# Patient Record
Sex: MALE | Race: WHITE | NOT HISPANIC OR LATINO | Employment: OTHER | URBAN - METROPOLITAN AREA
[De-identification: names, ages, dates, MRNs, and addresses within clinical notes are randomized per-mention and may not be internally consistent; named-entity substitution may affect disease eponyms.]

---

## 2017-04-04 ENCOUNTER — TELEPHONE (OUTPATIENT)
Dept: CARDIOLOGY | Facility: CLINIC | Age: 67
End: 2017-04-04

## 2017-04-04 NOTE — TELEPHONE ENCOUNTER
Patient called to let us know he had been in the hospital in [place] at [facility]. Patient then saw a general cardiologist in [place] as well in which he has those records. Patient was then referred to see an EP doctor, Dr. Claude Corcoran. We will try to obtain these records and have moved patient's appt up to May 22, 2017 to see Dr. Guthrie at 11:00 AM that day. Patient verbalized understanding.

## 2017-05-03 RX ORDER — CLOPIDOGREL BISULFATE 75 MG/1
75 TABLET ORAL DAILY
Qty: 30 TABLET | Refills: 11 | Status: SHIPPED | OUTPATIENT
Start: 2017-05-03 | End: 2017-10-03 | Stop reason: SDUPTHER

## 2017-05-22 ENCOUNTER — OFFICE VISIT (OUTPATIENT)
Dept: CARDIOLOGY | Facility: CLINIC | Age: 67
End: 2017-05-22

## 2017-05-22 VITALS
HEART RATE: 61 BPM | BODY MASS INDEX: 34.27 KG/M2 | WEIGHT: 239.4 LBS | DIASTOLIC BLOOD PRESSURE: 78 MMHG | SYSTOLIC BLOOD PRESSURE: 124 MMHG | HEIGHT: 70 IN

## 2017-05-22 DIAGNOSIS — I25.10 CORONARY ARTERY DISEASE INVOLVING NATIVE CORONARY ARTERY OF NATIVE HEART WITHOUT ANGINA PECTORIS: ICD-10-CM

## 2017-05-22 DIAGNOSIS — I48.91 ATRIAL FIBRILLATION, UNSPECIFIED TYPE (HCC): Primary | ICD-10-CM

## 2017-05-22 DIAGNOSIS — E78.5 DYSLIPIDEMIA: ICD-10-CM

## 2017-05-22 DIAGNOSIS — I10 ESSENTIAL HYPERTENSION: ICD-10-CM

## 2017-05-22 PROCEDURE — 93000 ELECTROCARDIOGRAM COMPLETE: CPT | Performed by: INTERNAL MEDICINE

## 2017-05-22 PROCEDURE — 99214 OFFICE O/P EST MOD 30 MIN: CPT | Performed by: INTERNAL MEDICINE

## 2017-05-22 RX ORDER — DILTIAZEM HYDROCHLORIDE 180 MG/1
180 CAPSULE, COATED, EXTENDED RELEASE ORAL DAILY
COMMUNITY
End: 2018-04-11 | Stop reason: SDUPTHER

## 2017-05-22 RX ORDER — HYDRALAZINE HYDROCHLORIDE 100 MG/1
TABLET, FILM COATED ORAL 3 TIMES DAILY
COMMUNITY
Start: 2017-03-14 | End: 2017-05-22 | Stop reason: ALTCHOICE

## 2017-05-22 RX ORDER — SOTALOL HYDROCHLORIDE 80 MG/1
80 TABLET ORAL EVERY 12 HOURS SCHEDULED
Qty: 60 TABLET | Refills: 0 | Status: SHIPPED | OUTPATIENT
Start: 2017-05-22 | End: 2017-06-10 | Stop reason: SDUPTHER

## 2017-05-22 RX ORDER — TORSEMIDE 20 MG/1
20 TABLET ORAL DAILY
COMMUNITY
End: 2018-04-11 | Stop reason: SDUPTHER

## 2017-05-22 RX ORDER — METOPROLOL SUCCINATE 100 MG/1
100 TABLET, EXTENDED RELEASE ORAL 2 TIMES DAILY
COMMUNITY
End: 2017-05-22 | Stop reason: ALTCHOICE

## 2017-05-22 RX ORDER — LISINOPRIL 40 MG/1
40 TABLET ORAL DAILY
COMMUNITY
Start: 2017-04-09 | End: 2018-04-11 | Stop reason: SDUPTHER

## 2017-05-22 RX ORDER — CLONIDINE HYDROCHLORIDE 0.1 MG/1
0.1 TABLET ORAL AS NEEDED
COMMUNITY
End: 2017-05-22 | Stop reason: ALTCHOICE

## 2017-05-22 RX ORDER — FERROUS SULFATE 325(65) MG
325 TABLET ORAL
COMMUNITY
End: 2017-08-23

## 2017-05-25 ENCOUNTER — PREP FOR SURGERY (OUTPATIENT)
Dept: OTHER | Facility: HOSPITAL | Age: 67
End: 2017-05-25

## 2017-05-25 DIAGNOSIS — I48.19 PERSISTENT ATRIAL FIBRILLATION (HCC): Primary | ICD-10-CM

## 2017-05-25 RX ORDER — ACETAMINOPHEN 325 MG/1
650 TABLET ORAL EVERY 4 HOURS PRN
Status: CANCELLED | OUTPATIENT
Start: 2017-05-25

## 2017-05-25 RX ORDER — SODIUM CHLORIDE 0.9 % (FLUSH) 0.9 %
1-10 SYRINGE (ML) INJECTION AS NEEDED
Status: CANCELLED | OUTPATIENT
Start: 2017-05-25

## 2017-05-26 ENCOUNTER — HOSPITAL ENCOUNTER (OUTPATIENT)
Dept: CARDIOLOGY | Facility: HOSPITAL | Age: 67
Discharge: HOME | End: 2017-05-26
Attending: INTERNAL MEDICINE | Admitting: INTERNAL MEDICINE

## 2017-05-26 VITALS
SYSTOLIC BLOOD PRESSURE: 124 MMHG | HEART RATE: 68 BPM | WEIGHT: 234.35 LBS | HEIGHT: 70 IN | DIASTOLIC BLOOD PRESSURE: 75 MMHG | BODY MASS INDEX: 33.55 KG/M2 | RESPIRATION RATE: 18 BRPM | OXYGEN SATURATION: 100 % | TEMPERATURE: 97.6 F

## 2017-05-26 DIAGNOSIS — I48.19 PERSISTENT ATRIAL FIBRILLATION (HCC): ICD-10-CM

## 2017-05-26 DIAGNOSIS — I48.91 ATRIAL FIBRILLATION, UNSPECIFIED TYPE (HCC): ICD-10-CM

## 2017-05-26 LAB
ALBUMIN SERPL-MCNC: 4.3 G/DL (ref 3.2–4.8)
ALBUMIN/GLOB SERPL: 1.9 G/DL (ref 1.5–2.5)
ALP SERPL-CCNC: 81 U/L (ref 25–100)
ALT SERPL W P-5'-P-CCNC: 24 U/L (ref 7–40)
ANION GAP SERPL CALCULATED.3IONS-SCNC: 6 MMOL/L (ref 3–11)
AST SERPL-CCNC: 17 U/L (ref 0–33)
BILIRUB SERPL-MCNC: 1 MG/DL (ref 0.3–1.2)
BUN BLD-MCNC: 16 MG/DL (ref 9–23)
BUN/CREAT SERPL: 20 (ref 7–25)
CALCIUM SPEC-SCNC: 9.8 MG/DL (ref 8.7–10.4)
CHLORIDE SERPL-SCNC: 105 MMOL/L (ref 99–109)
CO2 SERPL-SCNC: 30 MMOL/L (ref 20–31)
CREAT BLD-MCNC: 0.8 MG/DL (ref 0.6–1.3)
DEPRECATED RDW RBC AUTO: 49.5 FL (ref 37–54)
ERYTHROCYTE [DISTWIDTH] IN BLOOD BY AUTOMATED COUNT: 13.9 % (ref 11.3–14.5)
GFR SERPL CREATININE-BSD FRML MDRD: 97 ML/MIN/1.73
GLOBULIN UR ELPH-MCNC: 2.3 GM/DL
GLUCOSE BLD-MCNC: 164 MG/DL (ref 70–100)
HBA1C MFR BLD: 7.4 % (ref 4.8–5.6)
HCT VFR BLD AUTO: 45.4 % (ref 38.9–50.9)
HGB BLD-MCNC: 14.4 G/DL (ref 13.1–17.5)
MCH RBC QN AUTO: 30.4 PG (ref 27–31)
MCHC RBC AUTO-ENTMCNC: 31.7 G/DL (ref 32–36)
MCV RBC AUTO: 96 FL (ref 80–99)
PLATELET # BLD AUTO: 247 10*3/MM3 (ref 150–450)
PMV BLD AUTO: 10.6 FL (ref 6–12)
POTASSIUM BLD-SCNC: 4.5 MMOL/L (ref 3.5–5.5)
PROT SERPL-MCNC: 6.6 G/DL (ref 5.7–8.2)
RBC # BLD AUTO: 4.73 10*6/MM3 (ref 4.2–5.76)
SODIUM BLD-SCNC: 141 MMOL/L (ref 132–146)
WBC NRBC COR # BLD: 10.21 10*3/MM3 (ref 3.5–10.8)

## 2017-05-26 PROCEDURE — 83036 HEMOGLOBIN GLYCOSYLATED A1C: CPT | Performed by: INTERNAL MEDICINE

## 2017-05-26 PROCEDURE — 85027 COMPLETE CBC AUTOMATED: CPT | Performed by: NURSE PRACTITIONER

## 2017-05-26 PROCEDURE — 36415 COLL VENOUS BLD VENIPUNCTURE: CPT

## 2017-05-26 PROCEDURE — 80053 COMPREHEN METABOLIC PANEL: CPT | Performed by: NURSE PRACTITIONER

## 2017-05-26 PROCEDURE — 93005 ELECTROCARDIOGRAM TRACING: CPT | Performed by: INTERNAL MEDICINE

## 2017-05-26 PROCEDURE — 92960 CARDIOVERSION ELECTRIC EXT: CPT

## 2017-05-26 PROCEDURE — 92960 CARDIOVERSION ELECTRIC EXT: CPT | Performed by: INTERNAL MEDICINE

## 2017-05-26 PROCEDURE — 25010000002 MIDAZOLAM PER 1 MG: Performed by: INTERNAL MEDICINE

## 2017-05-26 PROCEDURE — 93005 ELECTROCARDIOGRAM TRACING: CPT | Performed by: NURSE PRACTITIONER

## 2017-05-26 RX ORDER — ACETAMINOPHEN 325 MG/1
650 TABLET ORAL EVERY 4 HOURS PRN
Status: DISCONTINUED | OUTPATIENT
Start: 2017-05-26 | End: 2017-05-26 | Stop reason: HOSPADM

## 2017-05-26 RX ORDER — FLUMAZENIL 0.1 MG/ML
INJECTION INTRAVENOUS
Status: DISCONTINUED
Start: 2017-05-26 | End: 2017-05-26 | Stop reason: WASHOUT

## 2017-05-26 RX ORDER — SODIUM CHLORIDE 0.9 % (FLUSH) 0.9 %
1-10 SYRINGE (ML) INJECTION AS NEEDED
Status: DISCONTINUED | OUTPATIENT
Start: 2017-05-26 | End: 2017-05-26 | Stop reason: HOSPADM

## 2017-05-26 RX ORDER — MIDAZOLAM HYDROCHLORIDE 1 MG/ML
INJECTION INTRAMUSCULAR; INTRAVENOUS
Status: DISCONTINUED
Start: 2017-05-26 | End: 2017-05-26 | Stop reason: HOSPADM

## 2017-05-26 RX ORDER — HYDRALAZINE HYDROCHLORIDE 100 MG/1
100 TABLET, FILM COATED ORAL AS NEEDED
COMMUNITY
End: 2018-04-11 | Stop reason: SDUPTHER

## 2017-05-26 RX ORDER — NALOXONE HYDROCHLORIDE 0.4 MG/ML
INJECTION, SOLUTION INTRAMUSCULAR; INTRAVENOUS; SUBCUTANEOUS
Status: DISCONTINUED
Start: 2017-05-26 | End: 2017-05-26 | Stop reason: WASHOUT

## 2017-05-26 RX ORDER — MIDAZOLAM HYDROCHLORIDE 1 MG/ML
INJECTION INTRAMUSCULAR; INTRAVENOUS
Status: COMPLETED | OUTPATIENT
Start: 2017-05-26 | End: 2017-05-26

## 2017-05-26 RX ORDER — CLONIDINE HYDROCHLORIDE 0.1 MG/1
0.1 TABLET ORAL AS NEEDED
COMMUNITY
End: 2018-04-09 | Stop reason: ALTCHOICE

## 2017-05-26 RX ADMIN — METHOHEXITAL SODIUM 30 MG: 500 INJECTION, POWDER, LYOPHILIZED, FOR SOLUTION INTRAMUSCULAR; INTRAVENOUS; RECTAL at 09:54

## 2017-05-26 RX ADMIN — MIDAZOLAM HYDROCHLORIDE 2 MG: 1 INJECTION, SOLUTION INTRAMUSCULAR; INTRAVENOUS at 09:54

## 2017-06-06 ENCOUNTER — TELEPHONE (OUTPATIENT)
Dept: CARDIOLOGY | Facility: CLINIC | Age: 67
End: 2017-06-06

## 2017-06-06 NOTE — TELEPHONE ENCOUNTER
Spoke with Dr. Guthrie. Patient needs to get an ekg to confirm that he is atrial fibrillation. If he is he needs to be referred to EP for further treatment.

## 2017-06-06 NOTE — TELEPHONE ENCOUNTER
Patient returned the missed call at 4:13pm.  I attempted to call him back at 4:35pm and No answer again. Awaiting his return call.

## 2017-06-07 NOTE — TELEPHONE ENCOUNTER
Patient called back and stated that he could not get his EKG done in his hometown.  He is going to come to our office on 6/8/17 and have his EKG done.

## 2017-06-12 RX ORDER — SOTALOL HYDROCHLORIDE 80 MG/1
80 TABLET ORAL EVERY 12 HOURS SCHEDULED
Qty: 60 TABLET | Refills: 1 | Status: SHIPPED | OUTPATIENT
Start: 2017-06-12 | End: 2017-08-25 | Stop reason: HOSPADM

## 2017-06-12 RX ORDER — SOTALOL HYDROCHLORIDE 80 MG/1
TABLET ORAL
Qty: 60 TABLET | Refills: 1 | Status: SHIPPED | OUTPATIENT
Start: 2017-06-12 | End: 2017-06-12 | Stop reason: SDUPTHER

## 2017-06-26 ENCOUNTER — OFFICE VISIT (OUTPATIENT)
Dept: CARDIOLOGY | Facility: CLINIC | Age: 67
End: 2017-06-26

## 2017-06-26 VITALS
DIASTOLIC BLOOD PRESSURE: 76 MMHG | HEART RATE: 77 BPM | BODY MASS INDEX: 32.04 KG/M2 | WEIGHT: 223.8 LBS | SYSTOLIC BLOOD PRESSURE: 118 MMHG | HEIGHT: 70 IN

## 2017-06-26 DIAGNOSIS — I48.0 PAROXYSMAL ATRIAL FIBRILLATION (HCC): ICD-10-CM

## 2017-06-26 DIAGNOSIS — I25.10 CORONARY ARTERY DISEASE INVOLVING NATIVE CORONARY ARTERY OF NATIVE HEART WITHOUT ANGINA PECTORIS: Primary | ICD-10-CM

## 2017-06-26 PROCEDURE — 93000 ELECTROCARDIOGRAM COMPLETE: CPT | Performed by: INTERNAL MEDICINE

## 2017-06-26 PROCEDURE — 99214 OFFICE O/P EST MOD 30 MIN: CPT | Performed by: INTERNAL MEDICINE

## 2017-06-26 NOTE — PROGRESS NOTES
Subjective:     Encounter Date:06/26/2017      Patient ID: Juan Lauren is a 66 y.o. male.    Chief Complaint: Coronary Artery Disease; Atrial Fibrillation; s/p Cardioversion; Loss of Consciousness (collasped on 6/24/17); and Fatigue    PROBLEM LIST:  1. CAD:   a. January 2004, ST elevation MI, cardiac cath revealed a 95% RCA stenosis, which was reduced to 0% after a 3.0 x 18 mm Cypher stent. There was no critical left coronary anatomy disease.   b. December 2012, GXT echocardiogram negative for ischemia.  c. On 10/07/2015, acute inferior STEMI. Catheterization is 90% thrombotic, VLST of RCA (neoatherosclerosis with plaque rupture). Restented 3.5 mm x 23 mm Xience Alpine. EF 45%.   2. Atrial fibrillation  a. Initial diagnosis in [facility]) 1/17. With RVR.  b. LEXII 1/17 possible OSCAR clot.  c. LEXII guided cardioversion 3/17 (4)  d. Recurrent asymptomatic A. fib 5/17, controlled rate.  e. Successful cardioversion to sinus rhythm on 5/26/2017.  f. Cardioversion successful until 6/26/2017.  3. Hypertension.  4. Dyslipidemia.   5. History of tobacco abuse.  6. Obstructive sleep apnea   a. Treated with CPAP   7. Family history of CAD with father having an MI in his 50s.  8. Surgeries:   a. Appendectomy.   b. Adenoidectomy.     History of Present Illness  Mr. Lauren returns today for a four week follow up s/p cardioversion with a history of atrial fibrillation and coronary artery disease. Since his last visit, patient has had a cardioversion which was initially successful; however, he noticed an irregularity in pulse. He experienced one episode of syncope within the last week upon physical exertion (he was bent over, and then jumped up) with no recurrences. He experiences shortness of breath on exertion. Patient desires to undergo radiofrequency ablation to resolve his atrial fibrillation. He inquires about the time of day he should be taking his medication. Denies any exertional chest pain, orthopnea,  PND. Patient remains active by walking 2 miles daily and as a result he has lost 10 pounds.    No Known Allergies      Current Outpatient Prescriptions:   •  apixaban (ELIQUIS) 5 MG tablet tablet, Take 5 mg by mouth 2 (Two) Times a Day., Disp: , Rfl:   •  atorvastatin (LIPITOR) 80 MG tablet, Take 80 mg by mouth Daily., Disp: , Rfl:   •  CloNIDine (CATAPRES) 0.1 MG tablet, Take 0.1 mg by mouth As Needed for High Blood Pressure., Disp: , Rfl:   •  clopidogrel (PLAVIX) 75 MG tablet, Take 1 tablet by mouth Daily., Disp: 30 tablet, Rfl: 11  •  diltiaZEM CD (CARDIZEM CD) 180 MG 24 hr capsule, Take 180 mg by mouth Daily., Disp: , Rfl:   •  ferrous sulfate 325 (65 FE) MG tablet, Take 325 mg by mouth Daily With Breakfast., Disp: , Rfl:   •  hydrALAZINE (APRESOLINE) 100 MG tablet, Take 100 mg by mouth As Needed., Disp: , Rfl:   •  lisinopril (PRINIVIL,ZESTRIL) 40 MG tablet, Daily., Disp: , Rfl:   •  melatonin 5 MG tablet tablet, Take 10 mg by mouth Daily., Disp: , Rfl:   •  sotalol (BETAPACE AF) 80 MG tablet tablet, Take 1 tablet by mouth Every 12 (Twelve) Hours., Disp: 60 tablet, Rfl: 1  •  torsemide (DEMADEX) 20 MG tablet, Take 20 mg by mouth Daily., Disp: , Rfl:   •  Vardenafil HCl (LEVITRA PO), Take  by mouth As Needed., Disp: , Rfl:     The following portions of the patient's history were reviewed and updated as appropriate: allergies, current medications, past family history, past medical history, past social history, past surgical history and problem list.    Review of Systems   Constitution: Negative.   Cardiovascular: Negative.    Respiratory: Negative.    Hematologic/Lymphatic: Negative for bleeding problem. Does not bruise/bleed easily.   Skin: Negative for rash.   Musculoskeletal: Negative for muscle weakness and myalgias.   Gastrointestinal: Negative for heartburn, nausea and vomiting.   Neurological: Negative.           Objective:     Vitals:    06/26/17 1033   BP: 118/76   BP Location: Left arm   Patient  "Position: Sitting   Pulse: 77   Weight: 223 lb 12.8 oz (102 kg)   Height: 70\" (177.8 cm)         Physical Exam   Constitutional: He is oriented to person, place, and time. He appears well-developed and well-nourished.   HENT:   Mouth/Throat: Oropharynx is clear and moist.   Neck: No JVD present. Carotid bruit is not present. No thyromegaly present.   Cardiovascular: Regular rhythm, S1 normal, S2 normal, normal heart sounds and intact distal pulses.  Exam reveals no gallop, no S3 and no S4.    No murmur heard.  Pulses:       Carotid pulses are 2+ on the right side, and 2+ on the left side.       Radial pulses are 2+ on the right side, and 2+ on the left side.   Pulmonary/Chest: Breath sounds normal.   Abdominal: Soft. Bowel sounds are normal. He exhibits no mass. There is no tenderness.   Musculoskeletal: He exhibits no edema.   Neurological: He is alert and oriented to person, place, and time.   Skin: Skin is warm and dry. No rash noted.       Lab Review:    Lab Results   Component Value Date    HGBA1C 7.40 (H) 05/26/2017     Lab Results   Component Value Date    GLUCOSE 164 (H) 05/26/2017    BUN 16 05/26/2017    CREATININE 0.80 05/26/2017    EGFRIFNONA 97 05/26/2017    BCR 20.0 05/26/2017    K 4.5 05/26/2017    CO2 30.0 05/26/2017    CALCIUM 9.8 05/26/2017    ALBUMIN 4.30 05/26/2017    LABIL2 1.9 05/26/2017    AST 17 05/26/2017    ALT 24 05/26/2017     Lab Results   Component Value Date    WBC 10.21 05/26/2017    HGB 14.4 05/26/2017    HCT 45.4 05/26/2017    MCV 96.0 05/26/2017     05/26/2017           ECG 12 Lead  Date/Time: 6/26/2017 1:02 PM  Performed by: KARMEN TOLEDO  Authorized by: KARMEN TOLEDO   Rhythm: atrial fibrillation                Assessment:   Juan was seen today for coronary artery disease, atrial fibrillation, s/p cardioversion, loss of consciousness and fatigue.    Diagnoses and all orders for this visit:    Coronary artery disease involving native coronary artery of native heart " without angina pectoris    Atrial fibrillation, unspecified type        Impression  1. CAD stable  2. Atrial fibrillation.  Recurrent on sotalol post-cardioversion.  Symptomatic.  3. Hypertension controlled  4. Obstructive sleep apnea wearing  5. Dyslipidemia controlled    Plan:  1. Continue to wear mask for CPAP.  2. Continue to exercise, and maintain healthy habits.  3. Discussed option with patient and wife.  Including Tikosyn versus PVA.  I favor the latter.  4. Schedule electrophysiology consult with Dr. May for for possible pulmonary vein ablation  5. Continue current medications.  6. Revisit in 12 MO, or sooner as needed.    Scribed for Mark Guthrie MD by Rose Marie Kennedy. 6/26/2017  10:59 AM    I, Mark Guthrie MD, personally performed the services described in this documentation as scribed by the above individual in my presence, and it is both accurate and complete      Please note that portions of this note may have been completed with a voice recognition program. Efforts were made to edit the dictations, but occasionally words are mistranscribed.

## 2017-06-28 ENCOUNTER — CONSULT (OUTPATIENT)
Dept: CARDIOLOGY | Facility: CLINIC | Age: 67
End: 2017-06-28

## 2017-06-28 VITALS
WEIGHT: 224.6 LBS | DIASTOLIC BLOOD PRESSURE: 66 MMHG | HEIGHT: 70 IN | HEART RATE: 91 BPM | BODY MASS INDEX: 32.16 KG/M2 | SYSTOLIC BLOOD PRESSURE: 118 MMHG

## 2017-06-28 DIAGNOSIS — I48.19 PERSISTENT ATRIAL FIBRILLATION (HCC): Primary | ICD-10-CM

## 2017-06-28 DIAGNOSIS — I25.10 CORONARY ARTERY DISEASE INVOLVING NATIVE CORONARY ARTERY OF NATIVE HEART WITHOUT ANGINA PECTORIS: ICD-10-CM

## 2017-06-28 DIAGNOSIS — I10 ESSENTIAL HYPERTENSION: ICD-10-CM

## 2017-06-28 PROCEDURE — 99244 OFF/OP CNSLTJ NEW/EST MOD 40: CPT | Performed by: INTERNAL MEDICINE

## 2017-06-28 NOTE — PROGRESS NOTES
Juan Lauren  1950  [phone]      06/28/2017    [facility] CARDIOLOGY     Trang Bellamy MD  [address] [id number]    Chief Complaint   Patient presents with   • Coronary Artery Disease   • Hypertension   • Atrial Fibrillation       PROBLEM LIST:  1. Persistent Atrial fibrillation  a. CHADSVasc = 2 on Eliquis   b. Initial diagnosis in [facility]) 1/17. With RVR.  c. LEXII 1/17 possible OSCAR clot. LEXII EF 55-60%  d. LEXII guided cardioversion 3/17 (4)  e. Recurrent asymptomatic A. fib 5/17, controlled rate.  f. Successful cardioversion to sinus rhythm on 5/26/2017 on Sotalol.  g. Cardioversion successful until 6/26/2017.  2. CAD:   a. January 2004, ST elevation MI, cardiac cath revealed a 95% RCA stenosis, which was reduced to 0% after a 3.0 x 18 mm Cypher stent. There was no critical left coronary anatomy disease.   b. December 2012, GXT echocardiogram negative for ischemia.  c. On 10/07/2015, acute inferior STEMI. Catheterization is 90% thrombotic, VLST of RCA (neoatherosclerosis with plaque rupture). Restented 3.5 mm x 23 mm Xience Alpine. EF 45%.  d. LEXII 1/2017: EF 55-60%   3. Hypertension.  4. Dyslipidemia.   5. History of tobacco abuse.  6. Obstructive sleep apnea   a. Treated with CPAP   7. Family history of CAD with father having an MI in his 50s.  8. Surgeries:   a. Appendectomy.   b. Adenoidectomy.    Allergies  No Known Allergies    Current Medications    Current Outpatient Prescriptions:   •  apixaban (ELIQUIS) 5 MG tablet tablet, Take 1 tablet by mouth 2 (Two) Times a Day., Disp: 180 tablet, Rfl: 3  •  atorvastatin (LIPITOR) 80 MG tablet, Take 80 mg by mouth Daily., Disp: , Rfl:   •  CloNIDine (CATAPRES) 0.1 MG tablet, Take 0.1 mg by mouth As Needed for High Blood Pressure., Disp: , Rfl:   •  clopidogrel (PLAVIX) 75 MG tablet, Take 1 tablet by mouth Daily., Disp: 30 tablet, Rfl: 11  •  diltiaZEM CD (CARDIZEM CD) 180 MG 24 hr capsule, Take  180 mg by mouth Daily., Disp: , Rfl:   •  ferrous sulfate 325 (65 FE) MG tablet, Take 325 mg by mouth Daily With Breakfast., Disp: , Rfl:   •  hydrALAZINE (APRESOLINE) 100 MG tablet, Take 100 mg by mouth As Needed., Disp: , Rfl:   •  lisinopril (PRINIVIL,ZESTRIL) 40 MG tablet, Daily., Disp: , Rfl:   •  melatonin 5 MG tablet tablet, Take 10 mg by mouth Daily., Disp: , Rfl:   •  sotalol (BETAPACE AF) 80 MG tablet tablet, Take 1 tablet by mouth Every 12 (Twelve) Hours., Disp: 60 tablet, Rfl: 1  •  torsemide (DEMADEX) 20 MG tablet, Take 20 mg by mouth Daily., Disp: , Rfl:   •  Vardenafil HCl (LEVITRA PO), Take  by mouth As Needed., Disp: , Rfl:     History of Present Illness   HPI    Pt presents for Atrial fibrillation consult to discuss PVA. He is referred by Dr. Guthrie.  He was diagnosed in January while in [place]. He underwent LEXII/ECV. He had recurrence and was started on Sotalol by Dr. Guthrie and underwent ECV again which was successful. However, he had recurrence after about one week. When he was in NSR, he did feel better. While in atrial fibrillation, he feels tired, fatigued, has exercise intolerance, and has less of an appetite. He has sleep apnea and is compliant with his CPAP. His BP was running high but now is controlled. He does have prn medications if needed. He denies thyroid problems. He denies excess caffeine, now he doesn't drink any caffeine. No ETOH. He used to smoke and quit in 2003. He used to chew but quit in January.     ROS:  General:  + fatigue, - weight gain or loss  Cardiovascular:  Denies CP, PND, syncope, near syncope, edema + palpitations.  Pulmonary:  -GRAYSON, +cough, or wheezing   Hematologic/Lymphatic: Negative for bleeding problem. Does not bruise/bleed easily.   Skin: Negative for rash.   Musculoskeletal: Negative for muscle weakness and myalgias.   Gastrointestinal: Negative for heartburn, nausea and vomiting.   Neurological: Negative      FAMILY HISTORY: Premature coronary artery  "disease in his father, in his 50s.     SOCIAL HISTORY: Patient is . He is retired. No longer smokes. No  significant alcohol use    Vitals:    06/28/17 1059   BP: 118/66   BP Location: Left arm   Patient Position: Sitting   Pulse: 91   Weight: 224 lb 9.6 oz (102 kg)   Height: 70\" (177.8 cm)     PE:  General: NAD  Neck: no JVD, no carotid bruits, no TM  Heart irr irr, NL S1, S2, no rubs, murmurs  Lungs: CTA, no wheezes, rhonchi, or rales  Abd: soft, non-tender, NL BS  Ext: No musculoskeletal deformities, no edema, cyanosis, or clubbing  Psych: normal mood and affect    Diagnostic Data:  Procedures     EKG reviewed from Dr. Guthrie's clinic 6/26/17: atrial fibrillation 77 bpm    1. Persistent atrial fibrillation    2. Essential hypertension    3. Coronary artery disease involving native coronary artery of native heart without angina pectoris          Plan:  1) Persistent Atrial Fibrillation- failed Sotalol. Interested in PVA and does not wish to take Tikosyn. Dr. May talked with the patient about the procedure including details, risks, potential complications. The patient understands and wishes to proceed. We will put him on the schedule.    CHADSVasc = 2 on Eliquis  2) HTN- controlled  3) CAD- stable, continue following with Dr. Guthrie, continue Plavix    Scribed for Wayne May MD by Meghann Augustin PA-C. 6/28/2017  11:28 AM     I, Wayne May MD, personally performed the services face to face as described in this documentation and as scribed by the above named individual in my presence, and it is both accurate and complete.  6/28/2017  11:33 AM          "

## 2017-07-24 ENCOUNTER — PREP FOR SURGERY (OUTPATIENT)
Dept: OTHER | Facility: HOSPITAL | Age: 67
End: 2017-07-24

## 2017-07-24 DIAGNOSIS — I48.0 PAROXYSMAL ATRIAL FIBRILLATION (HCC): Primary | ICD-10-CM

## 2017-07-24 RX ORDER — SODIUM CHLORIDE 0.9 % (FLUSH) 0.9 %
1-10 SYRINGE (ML) INJECTION AS NEEDED
Status: CANCELLED | OUTPATIENT
Start: 2017-07-24

## 2017-08-14 PROBLEM — I48.19 PERSISTENT ATRIAL FIBRILLATION (HCC): Status: ACTIVE | Noted: 2017-05-22

## 2017-08-14 NOTE — NURSING NOTE
PRE-PVA ASSESSMENT  Juan Lauren 1950   [address] [facility] [address]   [phone] (home)     Referral Source: Dr. Guthrie  Information obtained from: [x] Medical record review  [] Patient report  Scheduled for: PVA on 8/24/17 with Dr May    AFib Specific History:   AFib Type: persistent  [id number] Score: 3  Contributing Factors: HTN and Vasc Dz, DM  Anticoagulation: Eliquis 5mg BID, plavix 75mg Q Day  Cardioversion x 2, March 2017 & 5/26/17 w/recurrence one month later  Failed AAD(s): sotalol  Prior Ablation: None    Is Mr. Lauren aware of his AFib? Yes   Onset: Dx'ed Jan 2017 in [place], [place]       Frequency: couple times a month       Duration: up to 10days   Exacerbations:    Alleviations:      Symptoms:   [] Palpitations:     [] Chest Discomfort:    [] Dizziness:    [] Presyncope:    [] Lightheadedness:   [] Syncope:    [x] Fatigue:    [x] Other: exercise intolerance, decreased appetite   [] Short of Breath:     Last Echo(s):  [] TTE Date:           [x] LEXII Date: 1/9/17       EF: %     EF: 55-65%        LA: cm    LA: mildly dilated, possible thrombus        VHD?     VHD? Mild TR     Past medical History:   [x] Diabetes: recently diagnosed            Tx? Diet & exercise alone right now           Hemoglobin A1C   Date Value Ref Range Status   05/26/2017 7.40 (H) 4.80 - 5.60 % Final   10/08/2015 6.0 4.00 - 6.00 % Final     Comment:     The American Diabetes Association recommends maintenance of Hemoglobin  A1C at 7.0% or lower. Goals for Hemoglobin A1C reduction may need to be  modified if hypoglycemia is a problem.         [] HYPOthyroidism  [] HYPERthyroidism -NO   TSH   Date Value Ref Range Status   08/23/2017 1.271 0.350 - 5.350 mIU/mL Final       [x] HTN        [x] Tx? Diltiazem 180mg Q Day, lisinopril 40mg Q day, torsemide 20mg Q Day, PRN clonidine (hasn't needed in past month)        [x] Controlled? Yes    [] Heart Failure -NO    [] CVA -NO                                  []  TIA -NO         [] Ischemic         [] Hemorrhagic         [] Nonischemic         [] Embolic        [] Diastolic    [x] CAD         [x] MI -STEMI 1/04 & 10/7/15         [x] Dyslipidemia -on lipitor    [x] Ischemic Evaluation       [] Stress Test:        [x] Heart Cath: Jan 2004: RCA 95%, reduced to 0% w/ cypher stent                               10/7/15: VLST of RCA (neoatherosclerosis with plaque rupture). Restented 3.5 mm x 23 mm Xience Alpine. EF 45%.     [x] Sleep Apnea Diagnosed       Device: CPAP        Compliance: compliance all of the time    [x] Obesity       [x] BMI 30-35 (32.1 on 6/28/17)        [] BMI >35         [x] Weight reduction discussed with Mr. Lauren         [x] Nutrition Consult            [x] Declined by Mr. Lauren -has already been working with diabetes nutrition; on low carb diet    [] Depression   [] Anxiety -NO                [] Urologic History -NO       [] Urologic cancer surgery         [] Severe decrease in flow of urine stream        [] Recent unexplained gross hematuria        [] Hx urethral stricture     Other Pertinent PMH: None    Social / Lifestyle History:   [x]   Tobacco: Quit 2003                     [x] Former: yrs    []   Alcohol: None    []   Caffeine: 0 cups per day   []   Recreational Drugs: Denies   []   Stress Issues: Denies   [x]   Exercise: walking 4 miles a day    Summary of Patient Contact:  I spoke with Mr. Lauren about his upcoming PVA.  He was well informed about the procedure from prior discussion with Dr May and from reading the provided literature.  I answered a few remaining questions.  Mr. Lauren verbalized understanding and he is ready to proceed.      Judy Caisllas, NADERN, RN

## 2017-08-23 ENCOUNTER — APPOINTMENT (OUTPATIENT)
Dept: PREADMISSION TESTING | Facility: HOSPITAL | Age: 67
End: 2017-08-23

## 2017-08-23 ENCOUNTER — HOSPITAL ENCOUNTER (OUTPATIENT)
Dept: CT IMAGING | Facility: HOSPITAL | Age: 67
Discharge: HOME | End: 2017-08-23
Attending: INTERNAL MEDICINE | Admitting: INTERNAL MEDICINE

## 2017-08-23 DIAGNOSIS — I48.0 PAROXYSMAL ATRIAL FIBRILLATION (HCC): ICD-10-CM

## 2017-08-23 DIAGNOSIS — I48.19 PERSISTENT ATRIAL FIBRILLATION (HCC): Primary | ICD-10-CM

## 2017-08-23 DIAGNOSIS — I48.19 PERSISTENT ATRIAL FIBRILLATION (HCC): ICD-10-CM

## 2017-08-23 LAB
ANION GAP SERPL CALCULATED.3IONS-SCNC: 6 MMOL/L (ref 3–11)
BASOPHILS # BLD AUTO: 0.02 10*3/MM3 (ref 0–0.2)
BASOPHILS NFR BLD AUTO: 0.3 % (ref 0–1)
BUN BLD-MCNC: 12 MG/DL (ref 9–23)
BUN/CREAT SERPL: 15 (ref 7–25)
CALCIUM SPEC-SCNC: 10 MG/DL (ref 8.7–10.4)
CHLORIDE SERPL-SCNC: 102 MMOL/L (ref 99–109)
CO2 SERPL-SCNC: 31 MMOL/L (ref 20–31)
CREAT BLD-MCNC: 0.8 MG/DL (ref 0.6–1.3)
DEPRECATED RDW RBC AUTO: 49.3 FL (ref 37–54)
EOSINOPHIL # BLD AUTO: 0.06 10*3/MM3 (ref 0–0.3)
EOSINOPHIL NFR BLD AUTO: 1 % (ref 0–3)
ERYTHROCYTE [DISTWIDTH] IN BLOOD BY AUTOMATED COUNT: 13.9 % (ref 11.3–14.5)
GFR SERPL CREATININE-BSD FRML MDRD: 97 ML/MIN/1.73
GLUCOSE BLD-MCNC: 113 MG/DL (ref 70–100)
HCT VFR BLD AUTO: 42 % (ref 38.9–50.9)
HGB BLD-MCNC: 13.5 G/DL (ref 13.1–17.5)
IMM GRANULOCYTES # BLD: 0.01 10*3/MM3 (ref 0–0.03)
IMM GRANULOCYTES NFR BLD: 0.2 % (ref 0–0.6)
INR PPP: 1.04
LYMPHOCYTES # BLD AUTO: 1.11 10*3/MM3 (ref 0.6–4.8)
LYMPHOCYTES NFR BLD AUTO: 19.2 % (ref 24–44)
MCH RBC QN AUTO: 30.6 PG (ref 27–31)
MCHC RBC AUTO-ENTMCNC: 32.1 G/DL (ref 32–36)
MCV RBC AUTO: 95.2 FL (ref 80–99)
MONOCYTES # BLD AUTO: 0.43 10*3/MM3 (ref 0–1)
MONOCYTES NFR BLD AUTO: 7.5 % (ref 0–12)
NEUTROPHILS # BLD AUTO: 4.14 10*3/MM3 (ref 1.5–8.3)
NEUTROPHILS NFR BLD AUTO: 71.8 % (ref 41–71)
PLATELET # BLD AUTO: 213 10*3/MM3 (ref 150–450)
PMV BLD AUTO: 10.4 FL (ref 6–12)
POTASSIUM BLD-SCNC: 4.3 MMOL/L (ref 3.5–5.5)
PROTHROMBIN TIME: 11.4 SECONDS (ref 9.6–11.5)
RBC # BLD AUTO: 4.41 10*6/MM3 (ref 4.2–5.76)
SODIUM BLD-SCNC: 139 MMOL/L (ref 132–146)
TSH SERPL DL<=0.05 MIU/L-ACNC: 1.27 MIU/ML (ref 0.35–5.35)
WBC NRBC COR # BLD: 5.77 10*3/MM3 (ref 3.5–10.8)

## 2017-08-23 RX ORDER — DOCUSATE SODIUM 100 MG/1
100 CAPSULE, LIQUID FILLED ORAL 2 TIMES DAILY
COMMUNITY
End: 2018-04-09

## 2017-08-23 RX ADMIN — IOPAMIDOL 80 ML: 755 INJECTION, SOLUTION INTRAVENOUS at 11:58

## 2017-08-23 NOTE — DISCHARGE INSTRUCTIONS
The following instructions given during Pre Admission Testing visit:    Do not eat or drink anything after MN except for sips of water with your a.m. Prescription meds unless otherwise instructed by your physician.    Glasses and jewelry may be worn, but dentures must be removed prior to cath/procedure.    Leave any items you consider valuable at home.    Family members may wait in [facility] waiting area during procedure.    Bring all medications in their original containers the day of procedure.    Bring photo ID and insurance cards on the day of procedure.    Need to make arrangements for transportation prior to discharge.    The following handouts were given:     Heart Cath pathway (if applicable)   Cardiac Cath booklet published by [facility]    OR appropriate [facility] procedure booklet    If applicable, pt instructed to bring CPAP mask and tubing the day of procedure.

## 2017-08-24 ENCOUNTER — ANESTHESIA EVENT (OUTPATIENT)
Dept: CARDIOLOGY | Facility: HOSPITAL | Age: 67
End: 2017-08-24

## 2017-08-24 ENCOUNTER — HOSPITAL ENCOUNTER (OUTPATIENT)
Facility: HOSPITAL | Age: 67
Setting detail: OBSERVATION
Discharge: HOME | End: 2017-08-25
Attending: INTERNAL MEDICINE | Admitting: INTERNAL MEDICINE

## 2017-08-24 ENCOUNTER — ANESTHESIA (OUTPATIENT)
Dept: CARDIOLOGY | Facility: HOSPITAL | Age: 67
End: 2017-08-24

## 2017-08-24 DIAGNOSIS — I48.19 PERSISTENT ATRIAL FIBRILLATION (HCC): ICD-10-CM

## 2017-08-24 LAB
ACT BLD: 136 SECONDS (ref 82–152)
ACT BLD: 153 SECONDS (ref 82–152)
ACT BLD: 180 SECONDS (ref 82–152)
ACT BLD: 340 SECONDS (ref 82–152)
ACT BLD: 362 SECONDS (ref 82–152)
ACT BLD: 406 SECONDS (ref 82–152)
ACT BLD: 411 SECONDS (ref 82–152)
ACT BLD: 422 SECONDS (ref 82–152)

## 2017-08-24 PROCEDURE — 93656 COMPRE EP EVAL ABLTJ ATR FIB: CPT | Performed by: INTERNAL MEDICINE

## 2017-08-24 PROCEDURE — 25010000002 PROPOFOL 10 MG/ML EMULSION: Performed by: NURSE ANESTHETIST, CERTIFIED REGISTERED

## 2017-08-24 PROCEDURE — G0378 HOSPITAL OBSERVATION PER HR: HCPCS

## 2017-08-24 PROCEDURE — 93623 PRGRMD STIMJ&PACG IV RX NFS: CPT | Performed by: INTERNAL MEDICINE

## 2017-08-24 PROCEDURE — 25010000002 FUROSEMIDE PER 20 MG: Performed by: INTERNAL MEDICINE

## 2017-08-24 PROCEDURE — C1732 CATH, EP, DIAG/ABL, 3D/VECT: HCPCS | Performed by: INTERNAL MEDICINE

## 2017-08-24 PROCEDURE — 94660 CPAP INITIATION&MGMT: CPT

## 2017-08-24 PROCEDURE — C1759 CATH, INTRA ECHOCARDIOGRAPHY: HCPCS | Performed by: INTERNAL MEDICINE

## 2017-08-24 PROCEDURE — 93662 INTRACARDIAC ECG (ICE): CPT | Performed by: INTERNAL MEDICINE

## 2017-08-24 PROCEDURE — C1769 GUIDE WIRE: HCPCS | Performed by: INTERNAL MEDICINE

## 2017-08-24 PROCEDURE — C1894 INTRO/SHEATH, NON-LASER: HCPCS | Performed by: INTERNAL MEDICINE

## 2017-08-24 PROCEDURE — 25010000002 PROTAMINE SULFATE PER 10 MG: Performed by: INTERNAL MEDICINE

## 2017-08-24 PROCEDURE — C1730 CATH, EP, 19 OR FEW ELECT: HCPCS | Performed by: INTERNAL MEDICINE

## 2017-08-24 PROCEDURE — 25010000002 HEPARIN (PORCINE) PER 1000 UNITS: Performed by: INTERNAL MEDICINE

## 2017-08-24 PROCEDURE — 25010000002 DEXAMETHASONE PER 1 MG: Performed by: NURSE ANESTHETIST, CERTIFIED REGISTERED

## 2017-08-24 PROCEDURE — C1893 INTRO/SHEATH, FIXED,NON-PEEL: HCPCS | Performed by: INTERNAL MEDICINE

## 2017-08-24 PROCEDURE — 25010000002 KETOROLAC TROMETHAMINE PER 15 MG: Performed by: INTERNAL MEDICINE

## 2017-08-24 PROCEDURE — 93613 INTRACARDIAC EPHYS 3D MAPG: CPT | Performed by: INTERNAL MEDICINE

## 2017-08-24 PROCEDURE — 25010000002 NEOSTIGMINE PER 0.5 MG: Performed by: NURSE ANESTHETIST, CERTIFIED REGISTERED

## 2017-08-24 PROCEDURE — 25010000002 ONDANSETRON PER 1 MG: Performed by: NURSE ANESTHETIST, CERTIFIED REGISTERED

## 2017-08-24 PROCEDURE — 85347 COAGULATION TIME ACTIVATED: CPT

## 2017-08-24 PROCEDURE — 94799 UNLISTED PULMONARY SVC/PX: CPT

## 2017-08-24 RX ORDER — PROMETHAZINE HYDROCHLORIDE 25 MG/ML
6.25 INJECTION, SOLUTION INTRAMUSCULAR; INTRAVENOUS ONCE AS NEEDED
Status: DISCONTINUED | OUTPATIENT
Start: 2017-08-24 | End: 2017-08-24 | Stop reason: HOSPADM

## 2017-08-24 RX ORDER — DEXAMETHASONE SODIUM PHOSPHATE 4 MG/ML
INJECTION, SOLUTION INTRA-ARTICULAR; INTRALESIONAL; INTRAMUSCULAR; INTRAVENOUS; SOFT TISSUE AS NEEDED
Status: DISCONTINUED | OUTPATIENT
Start: 2017-08-24 | End: 2017-08-24 | Stop reason: SURG

## 2017-08-24 RX ORDER — FENTANYL CITRATE 50 UG/ML
50 INJECTION, SOLUTION INTRAMUSCULAR; INTRAVENOUS
Status: DISCONTINUED | OUTPATIENT
Start: 2017-08-24 | End: 2017-08-24 | Stop reason: HOSPADM

## 2017-08-24 RX ORDER — TORSEMIDE 20 MG/1
20 TABLET ORAL DAILY
Status: DISCONTINUED | OUTPATIENT
Start: 2017-08-24 | End: 2017-08-25 | Stop reason: HOSPADM

## 2017-08-24 RX ORDER — DILTIAZEM HYDROCHLORIDE 180 MG/1
180 CAPSULE, COATED, EXTENDED RELEASE ORAL DAILY
Status: DISCONTINUED | OUTPATIENT
Start: 2017-08-25 | End: 2017-08-25 | Stop reason: HOSPADM

## 2017-08-24 RX ORDER — ESMOLOL HYDROCHLORIDE 10 MG/ML
INJECTION INTRAVENOUS AS NEEDED
Status: DISCONTINUED | OUTPATIENT
Start: 2017-08-24 | End: 2017-08-24 | Stop reason: SURG

## 2017-08-24 RX ORDER — SODIUM CHLORIDE 9 MG/ML
INJECTION, SOLUTION INTRAVENOUS CONTINUOUS PRN
Status: DISCONTINUED | OUTPATIENT
Start: 2017-08-24 | End: 2017-08-24 | Stop reason: HOSPADM

## 2017-08-24 RX ORDER — HYDROCODONE BITARTRATE AND ACETAMINOPHEN 5; 325 MG/1; MG/1
1 TABLET ORAL EVERY 4 HOURS PRN
Status: DISCONTINUED | OUTPATIENT
Start: 2017-08-24 | End: 2017-08-25 | Stop reason: HOSPADM

## 2017-08-24 RX ORDER — PROPOFOL 10 MG/ML
VIAL (ML) INTRAVENOUS AS NEEDED
Status: DISCONTINUED | OUTPATIENT
Start: 2017-08-24 | End: 2017-08-24 | Stop reason: SURG

## 2017-08-24 RX ORDER — PROMETHAZINE HYDROCHLORIDE 25 MG/1
25 SUPPOSITORY RECTAL ONCE AS NEEDED
Status: DISCONTINUED | OUTPATIENT
Start: 2017-08-24 | End: 2017-08-24 | Stop reason: HOSPADM

## 2017-08-24 RX ORDER — LIDOCAINE HYDROCHLORIDE 10 MG/ML
INJECTION, SOLUTION INFILTRATION; PERINEURAL AS NEEDED
Status: DISCONTINUED | OUTPATIENT
Start: 2017-08-24 | End: 2017-08-24 | Stop reason: SURG

## 2017-08-24 RX ORDER — ONDANSETRON 2 MG/ML
INJECTION INTRAMUSCULAR; INTRAVENOUS AS NEEDED
Status: DISCONTINUED | OUTPATIENT
Start: 2017-08-24 | End: 2017-08-24 | Stop reason: SURG

## 2017-08-24 RX ORDER — ATRACURIUM BESYLATE 10 MG/ML
INJECTION, SOLUTION INTRAVENOUS AS NEEDED
Status: DISCONTINUED | OUTPATIENT
Start: 2017-08-24 | End: 2017-08-24 | Stop reason: SURG

## 2017-08-24 RX ORDER — PROMETHAZINE HYDROCHLORIDE 25 MG/1
25 TABLET ORAL ONCE AS NEEDED
Status: DISCONTINUED | OUTPATIENT
Start: 2017-08-24 | End: 2017-08-24 | Stop reason: HOSPADM

## 2017-08-24 RX ORDER — FUROSEMIDE 10 MG/ML
INJECTION INTRAMUSCULAR; INTRAVENOUS AS NEEDED
Status: DISCONTINUED | OUTPATIENT
Start: 2017-08-24 | End: 2017-08-24 | Stop reason: HOSPADM

## 2017-08-24 RX ORDER — HYDROMORPHONE HYDROCHLORIDE 1 MG/ML
0.5 INJECTION, SOLUTION INTRAMUSCULAR; INTRAVENOUS; SUBCUTANEOUS
Status: DISCONTINUED | OUTPATIENT
Start: 2017-08-24 | End: 2017-08-24 | Stop reason: HOSPADM

## 2017-08-24 RX ORDER — KETOROLAC TROMETHAMINE 15 MG/ML
15 INJECTION, SOLUTION INTRAMUSCULAR; INTRAVENOUS EVERY 8 HOURS
Status: DISCONTINUED | OUTPATIENT
Start: 2017-08-24 | End: 2017-08-25 | Stop reason: HOSPADM

## 2017-08-24 RX ORDER — LISINOPRIL 40 MG/1
40 TABLET ORAL DAILY
Status: DISCONTINUED | OUTPATIENT
Start: 2017-08-24 | End: 2017-08-25

## 2017-08-24 RX ORDER — HEPARIN SODIUM 1000 [USP'U]/ML
INJECTION, SOLUTION INTRAVENOUS; SUBCUTANEOUS AS NEEDED
Status: DISCONTINUED | OUTPATIENT
Start: 2017-08-24 | End: 2017-08-24 | Stop reason: HOSPADM

## 2017-08-24 RX ORDER — DOCUSATE SODIUM 100 MG/1
100 CAPSULE, LIQUID FILLED ORAL 2 TIMES DAILY
Status: DISCONTINUED | OUTPATIENT
Start: 2017-08-24 | End: 2017-08-25

## 2017-08-24 RX ORDER — HYDRALAZINE HYDROCHLORIDE 50 MG/1
100 TABLET, FILM COATED ORAL AS NEEDED
Status: DISCONTINUED | OUTPATIENT
Start: 2017-08-24 | End: 2017-08-25 | Stop reason: HOSPADM

## 2017-08-24 RX ORDER — PROTAMINE SULFATE 10 MG/ML
INJECTION, SOLUTION INTRAVENOUS AS NEEDED
Status: DISCONTINUED | OUTPATIENT
Start: 2017-08-24 | End: 2017-08-24 | Stop reason: HOSPADM

## 2017-08-24 RX ORDER — LIDOCAINE HYDROCHLORIDE 10 MG/ML
INJECTION, SOLUTION INFILTRATION; PERINEURAL AS NEEDED
Status: DISCONTINUED | OUTPATIENT
Start: 2017-08-24 | End: 2017-08-24 | Stop reason: HOSPADM

## 2017-08-24 RX ORDER — ATORVASTATIN CALCIUM 40 MG/1
80 TABLET, FILM COATED ORAL NIGHTLY
Status: DISCONTINUED | OUTPATIENT
Start: 2017-08-24 | End: 2017-08-25 | Stop reason: HOSPADM

## 2017-08-24 RX ORDER — ROCURONIUM BROMIDE 10 MG/ML
INJECTION, SOLUTION INTRAVENOUS AS NEEDED
Status: DISCONTINUED | OUTPATIENT
Start: 2017-08-24 | End: 2017-08-24 | Stop reason: SURG

## 2017-08-24 RX ORDER — MEPERIDINE HYDROCHLORIDE 25 MG/ML
12.5 INJECTION INTRAMUSCULAR; INTRAVENOUS; SUBCUTANEOUS
Status: DISCONTINUED | OUTPATIENT
Start: 2017-08-24 | End: 2017-08-24 | Stop reason: HOSPADM

## 2017-08-24 RX ORDER — GLYCOPYRROLATE 0.2 MG/ML
INJECTION INTRAMUSCULAR; INTRAVENOUS AS NEEDED
Status: DISCONTINUED | OUTPATIENT
Start: 2017-08-24 | End: 2017-08-24 | Stop reason: SURG

## 2017-08-24 RX ORDER — SODIUM CHLORIDE 9 MG/ML
INJECTION, SOLUTION INTRAVENOUS CONTINUOUS PRN
Status: DISCONTINUED | OUTPATIENT
Start: 2017-08-24 | End: 2017-08-24 | Stop reason: SURG

## 2017-08-24 RX ORDER — CLOPIDOGREL BISULFATE 75 MG/1
75 TABLET ORAL DAILY
Status: DISCONTINUED | OUTPATIENT
Start: 2017-08-25 | End: 2017-08-25 | Stop reason: HOSPADM

## 2017-08-24 RX ORDER — CLONIDINE HYDROCHLORIDE 0.1 MG/1
0.1 TABLET ORAL AS NEEDED
Status: DISCONTINUED | OUTPATIENT
Start: 2017-08-24 | End: 2017-08-25 | Stop reason: HOSPADM

## 2017-08-24 RX ORDER — SUCRALFATE 1 G/1
1 TABLET ORAL
Status: DISCONTINUED | OUTPATIENT
Start: 2017-08-24 | End: 2017-08-25 | Stop reason: HOSPADM

## 2017-08-24 RX ORDER — SODIUM CHLORIDE 0.9 % (FLUSH) 0.9 %
1-10 SYRINGE (ML) INJECTION AS NEEDED
Status: DISCONTINUED | OUTPATIENT
Start: 2017-08-24 | End: 2017-08-24

## 2017-08-24 RX ORDER — SUCRALFATE ORAL 1 G/10ML
1 SUSPENSION ORAL
Status: DISCONTINUED | OUTPATIENT
Start: 2017-08-24 | End: 2017-08-24 | Stop reason: SDUPTHER

## 2017-08-24 RX ORDER — PANTOPRAZOLE SODIUM 40 MG/1
40 TABLET, DELAYED RELEASE ORAL
Status: DISCONTINUED | OUTPATIENT
Start: 2017-08-24 | End: 2017-08-25 | Stop reason: HOSPADM

## 2017-08-24 RX ADMIN — DEXAMETHASONE SODIUM PHOSPHATE 4 MG: 4 INJECTION, SOLUTION INTRAMUSCULAR; INTRAVENOUS at 10:40

## 2017-08-24 RX ADMIN — Medication: at 17:30

## 2017-08-24 RX ADMIN — ROCURONIUM BROMIDE 10 MG: 10 INJECTION, SOLUTION INTRAVENOUS at 11:00

## 2017-08-24 RX ADMIN — ROCURONIUM BROMIDE 40 MG: 10 INJECTION, SOLUTION INTRAVENOUS at 10:25

## 2017-08-24 RX ADMIN — DOCUSATE SODIUM 100 MG: 100 CAPSULE, LIQUID FILLED ORAL at 16:58

## 2017-08-24 RX ADMIN — KETOROLAC TROMETHAMINE 15 MG: 15 INJECTION, SOLUTION INTRAMUSCULAR; INTRAVENOUS at 16:58

## 2017-08-24 RX ADMIN — SUCRALFATE 1 G: 1 TABLET ORAL at 16:58

## 2017-08-24 RX ADMIN — HYDROCODONE BITARTRATE AND ACETAMINOPHEN 1 TABLET: 5; 325 TABLET ORAL at 23:58

## 2017-08-24 RX ADMIN — KETOROLAC TROMETHAMINE 15 MG: 15 INJECTION, SOLUTION INTRAMUSCULAR; INTRAVENOUS at 23:58

## 2017-08-24 RX ADMIN — SODIUM CHLORIDE: 9 INJECTION, SOLUTION INTRAVENOUS at 10:21

## 2017-08-24 RX ADMIN — ATRACURIUM BESYLATE 50 MG: 10 INJECTION, SOLUTION INTRAVENOUS at 11:35

## 2017-08-24 RX ADMIN — LIDOCAINE HYDROCHLORIDE 50 MG: 10 INJECTION, SOLUTION INFILTRATION; PERINEURAL at 10:25

## 2017-08-24 RX ADMIN — ESMOLOL HYDROCHLORIDE 10 MG: 10 INJECTION, SOLUTION INTRAVENOUS at 10:25

## 2017-08-24 RX ADMIN — ATORVASTATIN CALCIUM 80 MG: 40 TABLET, FILM COATED ORAL at 20:57

## 2017-08-24 RX ADMIN — SUCRALFATE 1 G: 1 TABLET ORAL at 20:57

## 2017-08-24 RX ADMIN — APIXABAN 5 MG: 5 TABLET, FILM COATED ORAL at 16:58

## 2017-08-24 RX ADMIN — TORSEMIDE 20 MG: 20 TABLET ORAL at 16:58

## 2017-08-24 RX ADMIN — PROPOFOL 150 MG: 10 INJECTION, EMULSION INTRAVENOUS at 10:25

## 2017-08-24 RX ADMIN — GLYCOPYRROLATE 0.8 MG: 0.2 INJECTION, SOLUTION INTRAMUSCULAR; INTRAVENOUS at 13:25

## 2017-08-24 RX ADMIN — Medication 5 MG: at 13:25

## 2017-08-24 RX ADMIN — ONDANSETRON 4 MG: 2 INJECTION INTRAMUSCULAR; INTRAVENOUS at 13:20

## 2017-08-24 NOTE — H&P
Cardiology Consult/H&P     Juan Lauren  1950  [phone]      08/24/17    DATE OF ADMISSION: 8/24/2017  [facility]    Trang Bellamy MD  [address] [place] [address]    No chief complaint on file.    PROBLEM LIST:  1. Persistent/Paroxysmal Atrial fibrillation  a. CHADSVasc = 2 on Eliquis   b. [place] [facility] [place] 1[place]17. With RVR.  c. LEXII 1/17 possible OSCAR clot. LEXII EF 55-60%  d. LEXII guided cardioversion 3/17 (4)  e. Recurrent asymptomatic A. fib 5/17, controlled rate.  f. Successful cardioversion to sinus rhythm on 5/26/2017 on Sotalol.  g. Cardioversion successful until 6/26/2017.  2. CAD:   a. January 2004, ST elevation MI, cardiac cath revealed a 95% RCA stenosis, which was reduced to 0% after a 3.0 x 18 mm Cypher stent. There was no critical left coronary anatomy disease.   b. December 2012, GXT echocardiogram negative for ischemia.  c. On 10/07/2015, acute inferior STEMI. Catheterization is 90% thrombotic, VLST of RCA (neoatherosclerosis with plaque rupture). Restented 3.5 mm x 23 mm Xience Alpine. EF 45%.  d. LEXII 1/2017: EF 55-60%   3. Hypertension.  4. Dyslipidemia.   5. History of tobacco abuse.  6. Obstructive sleep apnea   a. Treated with CPAP   7. Family history of CAD with father having an MI in his 50s.  8. Surgeries:   a. Appendectomy.   b. Adenoidectomy.      History of Present Illness:   Pt presents for PVA. He is referred by Dr. Guthrie.  He was diagnosed in January while in [place]. He underwent LEXII/ECV. He had recurrence and was started on Sotalol by Dr. Guthrie and underwent ECV again which was successful. However, he had recurrence after about one week. When he was in NSR, he did feel better. While in atrial fibrillation, he feels tired, fatigued, has exercise intolerance, and has less of an appetite. He has sleep apnea and is compliant with his CPAP. His BP was running high but now is controlled. He does have prn medications if  needed. He denies thyroid problems. He denies excess caffeine, now he doesn't drink any caffeine. No ETOH. He used to smoke and quit in 2003. He used to chew but quit in January. Since seen in clinic a couple months ago, he has gone in and out of afib. He has been in NSR for the last 10 days and does feel better. He has been off his Sotalol since 8/19/17. He has been compliant with his Eliquis with no missed doses.     No Known Allergies    Prior to Admission Medications     Prescriptions Last Dose Informant Patient Reported? Taking?    apixaban (ELIQUIS) 5 MG tablet tablet 8/24/2017 Medication Bottle No Yes    Take 1 tablet by mouth 2 (Two) Times a Day.    atorvastatin (LIPITOR) 80 MG tablet 8/23/2017 Medication Bottle Yes Yes    Take 80 mg by mouth Daily.    CloNIDine (CATAPRES) 0.1 MG tablet Past Week Medication Bottle Yes Yes    Take 0.1 mg by mouth As Needed for High Blood Pressure.    clopidogrel (PLAVIX) 75 MG tablet 8/24/2017 Medication Bottle No Yes    Take 1 tablet by mouth Daily.    diltiaZEM CD (CARDIZEM CD) 180 MG 24 hr capsule 8/24/2017 Medication Bottle Yes Yes    Take 180 mg by mouth Daily.    docusate sodium (COLACE) 100 MG capsule 8/23/2017 Medication Bottle Yes Yes    Take 100 mg by mouth 2 (Two) Times a Day.    hydrALAZINE (APRESOLINE) 100 MG tablet Past Week Medication Bottle Yes Yes    Take 100 mg by mouth As Needed.    lisinopril (PRINIVIL,ZESTRIL) 40 MG tablet 8/23/2017 Medication Bottle Yes Yes    Take 40 mg by mouth Daily.    melatonin 5 MG tablet tablet 8/23/2017 Medication Bottle Yes Yes    Take 10 mg by mouth Every Night.    sotalol (BETAPACE AF) 80 MG tablet tablet 8/18/2017 Medication Bottle No No    Take 1 tablet by mouth Every 12 (Twelve) Hours.    torsemide (DEMADEX) 20 MG tablet 8/23/2017 Medication Bottle Yes Yes    Take 20 mg by mouth Daily.            Current Facility-Administered Medications:   •  sodium chloride 0.9 % flush 1-10 mL, 1-10 mL, Intravenous, PRN, Erich Powell  "WILIAM MILLER    Social History     Social History   • Marital status:      Spouse name: N/A   • Number of children: N/A   • Years of education: N/A     Social History Main Topics   • Smoking status: Former Smoker     Packs/day: 1.00     Years: 36.00     Types: Cigarettes     Quit date: 2003   • Smokeless tobacco: Never Used   • Alcohol use No   • Drug use: No   • Sexual activity: Defer     Other Topics Concern   • None     Social History Narrative           FAMILY HISTORY: Premature coronary artery disease in his father, in his 50s.      REVIEW OF SYSTEMS:   CONST:  No weight loss, fever, chills, weakness + fatigue.   HEENT:  No visual loss, blurred vision, double vision, yellow sclerae.                   No hearing loss, congestion, sore throat.   SKIN:      No rashes, urticaria, ulcers, sores.     RESP:     + shortness of breath -, hemoptysis, cough, sputum.   GI:           No anorexia, nausea, vomiting, diarrhea. No abdominal pain, melena.   :         No burning on urination, hematuria or increased frequency.  ENDO:    No diaphoresis, cold or heat intolerance. No polyuria or polydipsia.   NEURO:  No headache, dizziness, syncope, paralysis, ataxia, or parasthesias.                  No change in bowel or bladder control. No history of CVA/TIA  MUSC:    No muscle, back pain, joint pain or stiffness.   HEME:    No anemia, bleeding, bruising. No history of DVT/PE.  PSYCH:  No history of depression, anxiety    Vitals:    08/24/17 0820   BP: 146/78   BP Location: Left arm   Patient Position: Lying   Pulse: 59   Resp: 16   Temp: 98.2 °F (36.8 °C)   TempSrc: Oral   SpO2: 97%   Weight: 215 lb 6.2 oz (97.7 kg)   Height: 70\" (177.8 cm)         Vital Sign Min/Max for last 24 hours  Temp  Min: 98.2 °F (36.8 °C)  Max: 98.2 °F (36.8 °C)   BP  Min: 146/78  Max: 146/78   Pulse  Min: 59  Max: 59   Resp  Min: 16  Max: 16   SpO2  Min: 97 %  Max: 97 %   No Data Recorded    No intake or output data in the 24 hours ending 08/24/17 " 0853          Physical Exam:  GEN: Well nourished, Well- developed  No acute distress A & O x 3  HEENT: Normocephalic, Atraumatic, PERRLA, moist mucous membranes  NECK: supple, NO JVD, no thyromegaly, no lymphadenopathy  CARD: S1S2  RRR no murmur, gallop, rub  LUNGS: Clear to auscultataion, normal respiratory effort. No wheezes, rhonchi or rales  ABDOMEN: Soft, nontender, normal bowel sounds  EXTREMITIES:No gross deformities,  No clubbing, cyanosis, or edema  SKIN: Warm, dry, intact  NEURO: No focal deficits  PSYCHIATRIC: Normal affect and mood      Data:     Results from last 7 days  Lab Units 08/23/17  1045   WBC 10*3/mm3 5.77   HEMOGLOBIN g/dL 13.5   HEMATOCRIT % 42.0   PLATELETS 10*3/mm3 213       Results from last 7 days  Lab Units 08/23/17  1045   SODIUM mmol/L 139   POTASSIUM mmol/L 4.3   CHLORIDE mmol/L 102   CO2 mmol/L 31.0   BUN mg/dL 12   CREATININE mg/dL 0.80   GLUCOSE mg/dL 113*            Results from last 7 days  Lab Units 08/23/17  1045   TSH mIU/mL 1.271           Results from last 7 days  Lab Units 08/23/17  1045   PROTIME Seconds 11.4   INR  1.04       Telemetry:   EKG: EKG reviewed from Dr. Guthrie's clinic 6/26/17: atrial fibrillation 77 bpm    Assessment and Plan:   1) Paroxysmal Atrial Fibrillation- failed Sotalol. Here for PVA. Dr. May talked with the patient about the procedure including details, risks, potential complications. The patient understands and wishes to proceed. Currently in NSR.   CHADSVasc = 2 on Eliquis  2) HTN- controlled  3) CAD- stable, continue following with Dr. Guthrie, continue Plavix        [facility]  8/24/2017   8:54 AM        IWayne MD, personally performed the services face to face as described and documented by the above named individual. I have made any necessary edits and it is both accurate and complete 8/24/2017  10:52 AM

## 2017-08-24 NOTE — PLAN OF CARE
Problem: Cardiac Catheterization with/without PCI (Adult)  Goal: Signs and Symptoms of Listed Potential Problems Will be Absent or Manageable (Cardiac Catheterization with/without PCI)  Outcome: Ongoing (interventions implemented as appropriate)    08/24/17 0835   Cardiac Catheterization with/without PCI   Problems Assessed (Cardiac Catheterization) all   Problems Present (Cardiac Catheterization) none

## 2017-08-24 NOTE — PLAN OF CARE
Problem: Patient Care Overview (Adult)  Goal: Plan of Care Review    08/24/17 1631   Coping/Psychosocial Response Interventions   Plan Of Care Reviewed With patient   Patient Care Overview   Progress improving

## 2017-08-25 VITALS
DIASTOLIC BLOOD PRESSURE: 66 MMHG | HEIGHT: 70 IN | WEIGHT: 215.39 LBS | OXYGEN SATURATION: 100 % | SYSTOLIC BLOOD PRESSURE: 125 MMHG | BODY MASS INDEX: 30.84 KG/M2 | RESPIRATION RATE: 18 BRPM | TEMPERATURE: 98.7 F | HEART RATE: 62 BPM

## 2017-08-25 PROCEDURE — 93005 ELECTROCARDIOGRAM TRACING: CPT | Performed by: INTERNAL MEDICINE

## 2017-08-25 PROCEDURE — 25010000002 KETOROLAC TROMETHAMINE PER 15 MG: Performed by: INTERNAL MEDICINE

## 2017-08-25 PROCEDURE — 99225 PR SBSQ OBSERVATION CARE/DAY 25 MINUTES: CPT | Performed by: INTERNAL MEDICINE

## 2017-08-25 PROCEDURE — G0378 HOSPITAL OBSERVATION PER HR: HCPCS

## 2017-08-25 PROCEDURE — 93010 ELECTROCARDIOGRAM REPORT: CPT | Performed by: INTERNAL MEDICINE

## 2017-08-25 RX ORDER — SUCRALFATE ORAL 1 G/10ML
1 SUSPENSION ORAL
Qty: 210 ML | Refills: 0 | Status: SHIPPED | OUTPATIENT
Start: 2017-08-25 | End: 2017-09-01

## 2017-08-25 RX ORDER — SUCRALFATE 1 G/1
1 TABLET ORAL 3 TIMES DAILY
Qty: 21 TABLET | Refills: 0 | Status: SHIPPED | OUTPATIENT
Start: 2017-08-25 | End: 2017-08-25 | Stop reason: HOSPADM

## 2017-08-25 RX ORDER — LISINOPRIL 40 MG/1
40 TABLET ORAL NIGHTLY
Status: DISCONTINUED | OUTPATIENT
Start: 2017-08-25 | End: 2017-08-25 | Stop reason: HOSPADM

## 2017-08-25 RX ORDER — DOCUSATE SODIUM 100 MG/1
100 CAPSULE, LIQUID FILLED ORAL EVERY 12 HOURS SCHEDULED
Status: DISCONTINUED | OUTPATIENT
Start: 2017-08-25 | End: 2017-08-25 | Stop reason: HOSPADM

## 2017-08-25 RX ORDER — ESOMEPRAZOLE MAGNESIUM 40 MG/1
40 CAPSULE, DELAYED RELEASE ORAL
Qty: 30 CAPSULE | Refills: 0 | Status: SHIPPED | OUTPATIENT
Start: 2017-08-25 | End: 2017-09-24

## 2017-08-25 RX ADMIN — SUCRALFATE 1 G: 1 TABLET ORAL at 06:23

## 2017-08-25 RX ADMIN — CLOPIDOGREL BISULFATE 75 MG: 75 TABLET ORAL at 09:47

## 2017-08-25 RX ADMIN — DILTIAZEM HYDROCHLORIDE 180 MG: 180 CAPSULE, COATED, EXTENDED RELEASE ORAL at 09:47

## 2017-08-25 RX ADMIN — APIXABAN 5 MG: 5 TABLET, FILM COATED ORAL at 09:47

## 2017-08-25 RX ADMIN — TORSEMIDE 20 MG: 20 TABLET ORAL at 09:47

## 2017-08-25 RX ADMIN — SUCRALFATE 1 G: 1 TABLET ORAL at 13:05

## 2017-08-25 RX ADMIN — KETOROLAC TROMETHAMINE 15 MG: 15 INJECTION, SOLUTION INTRAMUSCULAR; INTRAVENOUS at 09:47

## 2017-08-25 RX ADMIN — PANTOPRAZOLE SODIUM 40 MG: 40 TABLET, DELAYED RELEASE ORAL at 06:23

## 2017-08-25 NOTE — PLAN OF CARE
Problem: Patient Care Overview (Adult)  Goal: Plan of Care Review  Outcome: Ongoing (interventions implemented as appropriate)    08/25/17 0424   Coping/Psychosocial Response Interventions   Plan Of Care Reviewed With patient   Patient Care Overview   Progress improving   Outcome Evaluation   Outcome Summary/Follow up Plan pt has remained in NSR this evening. no c/o pain or discomfort. pt off bedrest. pt has walked the unit multiple times. wears CPAP at HS. awaiting discharge         Problem: Arrhythmia/Dysrhythmia (Symptomatic) (Adult)  Goal: Signs and Symptoms of Listed Potential Problems Will be Absent or Manageable (Arrhythmia/Dysrhythmia)  Outcome: Ongoing (interventions implemented as appropriate)    08/25/17 0424   Arrhythmia/Dysrhythmia (Symptomatic)   Problems Assessed (Arrhythmia/Dysrhythmia) all   Problems Present (Arrhythmia/Dysrhythmia) none

## 2017-08-25 NOTE — PROGRESS NOTES
[facility]  Progress Note     LOS: 0 days   Patient Care Team:  Trang Bellamy MD as PCP - General (Internal Medicine)  Mark Guthrie MD as Consulting Physician (Cardiology)    Chief Complaint:  Follow up afib    Subjective       No complaints. Feels well and ready to go home. No CP or SOB.  Feels good with no new complaints          Review of Systems:   Pertinent positives in HPI, all others reviewed and negative.      Objective       Current Facility-Administered Medications:   •  apixaban (ELIQUIS) tablet 5 mg, 5 mg, Oral, BID, WILIAM Engel, 5 mg at 08/24/17 1658  •  atorvastatin (LIPITOR) tablet 80 mg, 80 mg, Oral, Nightly, WILIAM Engel, 80 mg at 08/24/17 2057  •  CloNIDine (CATAPRES) tablet 0.1 mg, 0.1 mg, Oral, PRN, WILIAM Engel  •  clopidogrel (PLAVIX) tablet 75 mg, 75 mg, Oral, Daily, WILIAM Engel  •  diltiaZEM CD (CARDIZEM CD) 24 hr capsule 180 mg, 180 mg, Oral, Daily, WILIAM Engel  •  docusate sodium (COLACE) capsule 100 mg, 100 mg, Oral, BID, WILIAM Engel, 100 mg at 08/24/17 1658  •  hydrALAZINE (APRESOLINE) tablet 100 mg, 100 mg, Oral, PRN, WILIAM Engel  •  HYDROcodone-acetaminophen (NORCO) 5-325 MG per tablet 1 tablet, 1 tablet, Oral, Q4H PRN, Wayne May MD, 1 tablet at 08/24/17 2358  •  ketorolac (TORADOL) injection 15 mg, 15 mg, Intravenous, Q8H, Wayne May MD, 15 mg at 08/24/17 2358  •  lisinopril (PRINIVIL,ZESTRIL) tablet 40 mg, 40 mg, Oral, Daily, WILIAM Engel, Stopped at 08/24/17 1645  •  pantoprazole (PROTONIX) EC tablet 40 mg, 40 mg, Oral, Q AM, Wayne May MD, 40 mg at 08/25/17 0623  •  sucralfate (CARAFATE) tablet 1 g, 1 g, Oral, 4x Daily AC & at Bedtime, Wayne May MD, 1 g at 08/25/17 0623  •  torsemide (DEMADEX) tablet 20 mg, 20 mg, Oral, Daily, WILIAM Engel, 20 mg at 08/24/17 1658    Vital Sign Min/Max for last 24 hours  Temp  Min: 97.6 °F (36.4 °C)  Max: 98.4 °F (36.9  "°C)   BP  Min: 110/62  Max: 146/78   Pulse  Min: 59  Max: 93   Resp  Min: 16  Max: 18   SpO2  Min: 94 %  Max: 98 %   Flow (L/min)  Min: 2  Max: 3   Weight  Min: 215 lb 6.2 oz (97.7 kg)  Max: 215 lb 6.2 oz (97.7 kg)     Flowsheet Rows         First Filed Value    Admission Height  70\" (177.8 cm) Documented at 08/24/2017 0820    Admission Weight  215 lb 6.2 oz (97.7 kg) Documented at 08/24/2017 0820          Intake/Output Summary (Last 24 hours) at 08/25/17 0747  Last data filed at 08/24/17 1900   Gross per 24 hour   Intake             2000 ml   Output             2705 ml   Net             -705 ml       Physical Exam:     General Appearance:    Alert, cooperative, in no acute distress   Lungs:     CTA     Heart:    RRR NL S1 S2 no murmurs   Chest Wall:    No abnormalities observed   Abdomen:     Normal bowel sounds, no masses,  soft  non-tender, non-distended, no guarding, no rebound tenderness   Extremities:   Moves all extremities well, no edema, no cyanosis, no             redness   Pulses:   Pulses palpable and equal bilaterally   Skin:   Groin and neck sites are clean, dry and intact. No redness, swelling or drainage. Agree        Results Review:     Results from last 7 days  Lab Units 08/23/17  1045   WBC 10*3/mm3 5.77   HEMOGLOBIN g/dL 13.5   HEMATOCRIT % 42.0   PLATELETS 10*3/mm3 213       Results from last 7 days  Lab Units 08/23/17  1045   SODIUM mmol/L 139   POTASSIUM mmol/L 4.3   CHLORIDE mmol/L 102   CO2 mmol/L 31.0   BUN mg/dL 12   CREATININE mg/dL 0.80   GLUCOSE mg/dL 113*                Results from last 7 days  Lab Units 08/23/17  1045   TSH mIU/mL 1.271           Results from last 7 days  Lab Units 08/23/17  1045   PROTIME Seconds 11.4   INR  1.04           Intake/Output Summary (Last 24 hours) at 08/25/17 0747  Last data filed at 08/24/17 1900   Gross per 24 hour   Intake             2000 ml   Output             2705 ml   Net             -705 ml       I personally viewed and interpreted the " patient's EKG/Telemetry data    EKG: NSR with PVC 63 bpm, old IWMI, Mild ST elevation diffudely    Telemetry: NSR 59-93 bpm, no AF      Present on Admission:  • Persistent atrial fibrillation    Assessment/Plan     1. Paroxysmal Atrial Fibrillation S/p Pulmonary Vein ablation. Now in NSR. Will rx: Carafate suspension 1 gm TID x 7 days and Nexium 40 mg PO daily 30 days. Will obtain an EKG in 48 hours then plan for follow up in 4 weeks in the Afib Clinic and in 12 weeks with Dr. May. Clinically improved  2. ST elevation: possible pericarditis due to RFA. Pt asymptomatic  3. Anticoagulation: continue Eliquis  4. HTN: well controlled- continue current medications.       Plan for disposition: The patient is stable and will be discharged to home  today with plan to follow up with Dr. May in 10-12 weeks.   WILIAM Nolan  08/25/17  7:47 AM      IWayne MD, personally performed the services face to face as described and documented by the above named individual. I have made any necessary edits and it is both accurate and complete 8/25/2017  8:06 AM

## 2017-08-25 NOTE — PROGRESS NOTES
Discharge Planning Assessment  [facility]     Patient Name: Juan Lauren  MRN: [id number]  Today's Date: 8/25/2017    Admit Date: 8/24/2017          Discharge Needs Assessment       08/25/17 0939    Living Environment    Lives With spouse    Living Arrangements --   Lives in [facility] Accessibility no concerns;bed and bath on same level    Type of Financial/Environmental Concern none    Transportation Available car;family or friend will provide    Living Environment    Provides Primary Care For no one    Quality Of Family Relationships supportive;helpful;involved    Able to Return to Prior Living Arrangements yes    Discharge Needs Assessment    Concerns To Be Addressed no discharge needs identified    Anticipated Changes Related to Illness none    Equipment Currently Used at Home cpap    Equipment Needed After Discharge none    Discharge Disposition still a patient    Discharge Contact Information if Applicable [phone]            Discharge Plan       08/25/17 0931    Case Management/Social Work Plan    Plan Home    Patient/Family In Agreement With Plan yes    Additional Comments Pt is independent of ADL's. No HH. He has [employer] employee insurance. No needs identified. CM will follow.         Discharge Placement     No information found        Expected Discharge Date and Time     Expected Discharge Date Expected Discharge Time    Aug 25, 2017 12:00 PM              Demographic Summary       08/25/17 0937    Referral Information    Referral Source admission list    Contact Information    Permission Granted to Share Information With     Primary Care Physician Information    Name Dr. Trang Bellamy            Functional Status       08/25/17 0938    Functional Status Prior    Ambulation 0-->independent    Transferring 0-->independent    Toileting 0-->independent    Bathing 0-->independent    Dressing 0-->independent    Eating 0-->independent    Communication  0-->understands/communicates without difficulty    Swallowing 0-->swallows foods/liquids without difficulty    IADL    Medications independent    Meal Preparation independent    Housekeeping independent    Laundry independent    Shopping independent    Oral Care independent    Activity Tolerance    Current Activity Limitations none    Usual Activity Tolerance moderate    Current Activity Tolerance moderate            Psychosocial     None            Abuse/Neglect     None            Legal     None            Substance Abuse     None            Patient Forms     None          Duyen Villegas

## 2017-08-25 NOTE — NURSING NOTE
Ablation Nurse Navigator    Pt s/p PVA yesterday. Doing well this AM. No C/O.  NSR w/PACs on Tele.  HR 60s, /74.  Has had breakfast without nausea and has ambulated without dizziness or groin issue.  I removed dressing from sites - stable with no oozing or ecchymosis.  Has not yet voided.  Likely going home later today, once he voids.  Reviewed D/C information with patient and his wife, particularly normal post procedural expectations and what to call for.  Ablation discharge instruction handout left for future reference.  Verbalized understanding.  Will follow up in the AFib Clinic in one month and with Dr. May in 3 months.  I gave them my contact information and encouraged to call me with questions or concerns in the interim.      NADER LauraN, RN  08/25/17  10:12 AM

## 2017-10-03 RX ORDER — CLOPIDOGREL BISULFATE 75 MG/1
75 TABLET ORAL DAILY
Qty: 90 TABLET | Refills: 2 | Status: SHIPPED | OUTPATIENT
Start: 2017-10-03 | End: 2018-04-11 | Stop reason: SDUPTHER

## 2017-10-04 RX ORDER — ENALAPRIL MALEATE 20 MG/1
TABLET ORAL
Qty: 180 TABLET | Refills: 3 | OUTPATIENT
Start: 2017-10-04

## 2018-04-09 ENCOUNTER — OFFICE VISIT (OUTPATIENT)
Dept: CARDIOLOGY | Facility: CLINIC | Age: 68
End: 2018-04-09

## 2018-04-09 VITALS
HEART RATE: 68 BPM | DIASTOLIC BLOOD PRESSURE: 90 MMHG | SYSTOLIC BLOOD PRESSURE: 140 MMHG | HEIGHT: 70 IN | WEIGHT: 225.4 LBS | BODY MASS INDEX: 32.27 KG/M2

## 2018-04-09 DIAGNOSIS — I48.0 PAROXYSMAL ATRIAL FIBRILLATION (HCC): ICD-10-CM

## 2018-04-09 DIAGNOSIS — I25.10 CORONARY ARTERY DISEASE INVOLVING NATIVE CORONARY ARTERY OF NATIVE HEART WITHOUT ANGINA PECTORIS: Primary | ICD-10-CM

## 2018-04-09 DIAGNOSIS — I10 ESSENTIAL HYPERTENSION: ICD-10-CM

## 2018-04-09 DIAGNOSIS — E78.5 DYSLIPIDEMIA: ICD-10-CM

## 2018-04-09 PROCEDURE — 99214 OFFICE O/P EST MOD 30 MIN: CPT | Performed by: NURSE PRACTITIONER

## 2018-04-09 RX ORDER — HYDROCHLOROTHIAZIDE 12.5 MG/1
12.5 CAPSULE, GELATIN COATED ORAL DAILY
Qty: 90 CAPSULE | Refills: 3 | Status: SHIPPED | OUTPATIENT
Start: 2018-04-09 | End: 2018-04-11 | Stop reason: SDUPTHER

## 2018-04-09 RX ORDER — VARDENAFIL HYDROCHLORIDE 20 MG/1
20 TABLET ORAL AS NEEDED
COMMUNITY
End: 2019-04-25

## 2018-04-09 NOTE — PROGRESS NOTES
"    Subjective:     Encounter Date:04/09/2018      Patient ID: Juan Lauren is a 67 y.o. male.    Chief Complaint: Atrial Fibrillation; Coronary Artery Disease; and Hypertension    PROBLEM LIST:    1. Coronary artery disease:   a. January 2004, ST elevation MI, cardiac cath revealed a 95% RCA stenosis, which was reduced to 0% after a 3.0 x 18 mm Cypher stent. There was no critical left coronary anatomy disease.   b. December 2012, GXT echocardiogram negative for ischemia.  c. On 10/07/2015, acute inferior STEMI. Catheterization is 90% thrombotic, VLST of RCA (neoatherosclerosis with plaque rupture). Restented 3.5 mm x 23 mm Xience Alpine. EF 45%.   2. Atrial fibrillation  a. [place] [facility] [place]. With RVR.  b. LEXII 1/17 possible OSCAR clot.  c. LEXII guided cardioversion 3/17 (4)  d. Recurrent asymptomatic A. fib 5/17, controlled rate.  e. Successful cardioversion to sinus rhythm on 5/26/2017.  f. Cardioversion successful until 6/26/2017.  g. 7/2017 PVA with Dr. May  3. Hypertension.  4. Dyslipidemia.   5. History of tobacco abuse.  6. Obstructive sleep apnea   a. Treated with CPAP   7. Family history of CAD with father having an MI in his 50s.  8. Surgeries:   a. Appendectomy.   b. Adenoidectomy.     History of Present Illness  Patient resents today for annual follow-up of his coronary artery disease.  Since last being seen he underwent pulmonary vein ablation with Dr. May.  Since that time he notes to be feeling \"great\".  He denies any chest pain, pressure, tightness.  Denies any increasing shortness of breath.  No syncope, near-syncope, or edema.  Does note that at times his systolic blood pressure will reach into the 160s and occasionally is taking APRN hydralazine or clonidine.  Notes that he is self changed his torsemide to as needed.  He has not been taking this as much.  His systolic blood pressures at home run in the 120s to 140s.  He has an appointment coming with his " primary care physician in the near term for labs.  Patient notes that since his ablation he has had no symptoms of his atrial fibrillation.    No Known Allergies      Current Outpatient Prescriptions:   •  apixaban (ELIQUIS) 5 MG tablet tablet, Take 1 tablet by mouth 2 (Two) Times a Day., Disp: 180 tablet, Rfl: 3  •  atorvastatin (LIPITOR) 80 MG tablet, Take 80 mg by mouth Daily., Disp: , Rfl:   •  CloNIDine (CATAPRES) 0.1 MG tablet, Take 0.1 mg by mouth As Needed for High Blood Pressure., Disp: , Rfl:   •  clopidogrel (PLAVIX) 75 MG tablet, Take 1 tablet by mouth Daily., Disp: 90 tablet, Rfl: 2  •  diltiaZEM CD (CARDIZEM CD) 180 MG 24 hr capsule, Take 180 mg by mouth Daily., Disp: , Rfl:   •  docusate sodium (COLACE) 50 MG capsule, Take  by mouth Daily As Needed for Constipation., Disp: , Rfl:   •  hydrALAZINE (APRESOLINE) 100 MG tablet, Take 100 mg by mouth As Needed., Disp: , Rfl:   •  lisinopril (PRINIVIL,ZESTRIL) 40 MG tablet, Take 40 mg by mouth Daily., Disp: , Rfl:   •  torsemide (DEMADEX) 20 MG tablet, Take 20 mg by mouth Daily., Disp: , Rfl:   •  vardenafil (LEVITRA) 20 MG tablet, Take 20 mg by mouth As Needed for erectile dysfunction., Disp: , Rfl:   •  melatonin 5 MG tablet tablet, Take 10 mg by mouth Every Night., Disp: , Rfl:     The following portions of the patient's history were reviewed and updated as appropriate: allergies, current medications, past family history, past medical history, past social history, past surgical history and problem list.    Review of Systems   Constitution: Negative.   Cardiovascular: Negative.    Respiratory: Negative.    Hematologic/Lymphatic: Negative for bleeding problem. Does not bruise/bleed easily.   Skin: Negative for rash.   Musculoskeletal: Negative for muscle weakness and myalgias.   Gastrointestinal: Negative for heartburn, nausea and vomiting.   Neurological: Negative.         Objective:     /90 (BP Location: Left arm, Patient Position: Sitting)   Pulse  "68   Ht 177.8 cm (70\")   Wt 102 kg (225 lb 6.4 oz)   BMI 32.34 kg/m²         Physical Exam   Constitutional: He is oriented to person, place, and time. He appears well-developed and well-nourished. No distress.   Neck: No JVD present. No tracheal deviation present.   No bruit auscultated bilaterally   Cardiovascular: Normal rate, regular rhythm and normal heart sounds.  Exam reveals no friction rub.    No murmur heard.  Pulmonary/Chest: Effort normal and breath sounds normal.   Abdominal: Soft. Bowel sounds are normal. There is no tenderness.   Musculoskeletal: He exhibits no edema or deformity.   Neurological: He is alert and oriented to person, place, and time.     Lab Review:    Procedures        Assessment:   Juan was seen today for atrial fibrillation, coronary artery disease and hypertension.    Diagnoses and all orders for this visit:    Coronary artery disease involving native coronary artery of native heart without angina pectoris    Paroxysmal atrial fibrillation, Status post PVA.  Currently doing well.  On chronic anticoagulation.    Essential hypertension, reportedly elevated at times at home.  Using when necessary hydralazine.  Patient would like this to be more regulated.    Dyslipidemia, on high intensity statin.  Due for labs with primary care within the next month.      Plan:  1. Stop clonidine. May keep prn hydralazine.  2. Start HCTZ 12.5 mg daily for his hypertension.  3. Continue anticoagulation for now.  4. Follow-up with Dr. May in 6 months.  5. Revisit in 12 MO, or sooner as needed.    BA Wesley     Dictated with Gianni.    "

## 2018-04-11 RX ORDER — LISINOPRIL 40 MG/1
40 TABLET ORAL DAILY
Qty: 90 TABLET | Refills: 3 | Status: SHIPPED | OUTPATIENT
Start: 2018-04-11 | End: 2019-04-25 | Stop reason: SDUPTHER

## 2018-04-11 RX ORDER — CLOPIDOGREL BISULFATE 75 MG/1
75 TABLET ORAL DAILY
Qty: 90 TABLET | Refills: 3 | Status: SHIPPED | OUTPATIENT
Start: 2018-04-11 | End: 2019-04-25 | Stop reason: SDUPTHER

## 2018-04-11 RX ORDER — TORSEMIDE 20 MG/1
20 TABLET ORAL DAILY
Qty: 90 TABLET | Refills: 3 | Status: SHIPPED | OUTPATIENT
Start: 2018-04-11 | End: 2018-10-31

## 2018-04-11 RX ORDER — HYDROCHLOROTHIAZIDE 12.5 MG/1
12.5 CAPSULE, GELATIN COATED ORAL DAILY
Qty: 90 CAPSULE | Refills: 3 | Status: SHIPPED | OUTPATIENT
Start: 2018-04-11 | End: 2018-10-31

## 2018-04-11 RX ORDER — HYDRALAZINE HYDROCHLORIDE 100 MG/1
100 TABLET, FILM COATED ORAL AS NEEDED
Qty: 90 TABLET | Refills: 2 | Status: SHIPPED | OUTPATIENT
Start: 2018-04-11 | End: 2018-10-31

## 2018-04-11 RX ORDER — ATORVASTATIN CALCIUM 80 MG/1
80 TABLET, FILM COATED ORAL DAILY
Qty: 90 TABLET | Refills: 4 | Status: SHIPPED | OUTPATIENT
Start: 2018-04-11 | End: 2019-04-21 | Stop reason: SDUPTHER

## 2018-04-11 RX ORDER — DILTIAZEM HYDROCHLORIDE 180 MG/1
180 CAPSULE, COATED, EXTENDED RELEASE ORAL DAILY
Qty: 90 CAPSULE | Refills: 4 | Status: SHIPPED | OUTPATIENT
Start: 2018-04-11 | End: 2019-04-25 | Stop reason: SDUPTHER

## 2018-07-03 ENCOUNTER — TELEPHONE (OUTPATIENT)
Dept: CARDIOLOGY | Facility: CLINIC | Age: 68
End: 2018-07-03

## 2018-07-03 NOTE — TELEPHONE ENCOUNTER
Patient is requesting clearance for an upcoming colonoscopy with . He wants to know how long to hold his Eliquis and Plavix before the procedure. Please advise.

## 2018-07-05 NOTE — TELEPHONE ENCOUNTER
Called patient and told him to hold plavix for 5 days and eliquis for 48 hours prior to his colonoscopy. He verbalized understanding.

## 2018-10-31 ENCOUNTER — OFFICE VISIT (OUTPATIENT)
Dept: CARDIOLOGY | Facility: CLINIC | Age: 68
End: 2018-10-31

## 2018-10-31 VITALS
DIASTOLIC BLOOD PRESSURE: 78 MMHG | BODY MASS INDEX: 34.07 KG/M2 | WEIGHT: 238 LBS | HEIGHT: 70 IN | HEART RATE: 66 BPM | OXYGEN SATURATION: 96 % | SYSTOLIC BLOOD PRESSURE: 156 MMHG

## 2018-10-31 DIAGNOSIS — I25.10 CORONARY ARTERY DISEASE INVOLVING NATIVE CORONARY ARTERY OF NATIVE HEART WITHOUT ANGINA PECTORIS: ICD-10-CM

## 2018-10-31 DIAGNOSIS — I48.19 PERSISTENT ATRIAL FIBRILLATION (HCC): Primary | ICD-10-CM

## 2018-10-31 DIAGNOSIS — I10 ESSENTIAL HYPERTENSION: ICD-10-CM

## 2018-10-31 PROCEDURE — 99213 OFFICE O/P EST LOW 20 MIN: CPT | Performed by: INTERNAL MEDICINE

## 2018-10-31 PROCEDURE — 93000 ELECTROCARDIOGRAM COMPLETE: CPT | Performed by: INTERNAL MEDICINE

## 2018-10-31 RX ORDER — NITROGLYCERIN 0.4 MG/1
0.4 TABLET SUBLINGUAL
COMMUNITY
End: 2019-04-25 | Stop reason: SDUPTHER

## 2018-10-31 NOTE — PROGRESS NOTES
Juan Lauren  1950  [phone]    10/31/2018    [facility] CARDIOLOGY     [place], Trang Mcguire MD  [address] [id number]    Chief Complaint   Patient presents with   • Atrial Fibrillation       Problem List:   PROBLEM LIST:  1. Persistent Atrial fibrillation  a. CHADSVasc = 2 on Eliquis   b. Initial diagnosis in [facility]) 1/17. With RVR.  c. LEXII 1/17 possible OSCAR clot. LEXII EF 55-60%  d. LEXII guided cardioversion 3/17 (4)  e. Recurrent asymptomatic A. fib 5/17, controlled rate.  f. Successful cardioversion to sinus rhythm on 5/26/2017 on Sotalol.  g. Cardioversion successful until 6/26/2017.  h. 8/24/17: PVA  2. CAD:   a. January 2004, ST elevation MI, cardiac cath revealed a 95% RCA stenosis, which was reduced to 0% after a 3.0 x 18 mm Cypher stent. There was no critical left coronary anatomy disease.   b. December 2012, GXT echocardiogram negative for ischemia.  c. On 10/07/2015, acute inferior STEMI. Catheterization is 90% thrombotic, VLST of RCA (neoatherosclerosis with plaque rupture). Restented 3.5 mm x 23 mm Xience Alpine. EF 45%.  d. LEXII 1/2017: EF 55-60%   3. Hypertension.  4. Dyslipidemia.   5. History of tobacco abuse.  6. Obstructive sleep apnea   a. Treated with CPAP   7. Family history of CAD with father having an MI in his 50s.  8. Surgeries:   a. Appendectomy.   b. Adenoidectomy.  Allergies  No Known Allergies    Current Medications    Current Outpatient Prescriptions:   •  apixaban (ELIQUIS) 5 MG tablet tablet, Take 1 tablet by mouth Every 12 (Twelve) Hours., Disp: 180 tablet, Rfl: 3  •  atorvastatin (LIPITOR) 80 MG tablet, Take 1 tablet by mouth Daily., Disp: 90 tablet, Rfl: 4  •  clopidogrel (PLAVIX) 75 MG tablet, Take 1 tablet by mouth Daily., Disp: 90 tablet, Rfl: 3  •  diltiaZEM CD (CARDIZEM CD) 180 MG 24 hr capsule, Take 1 capsule by mouth Daily., Disp: 90 capsule, Rfl: 4  •  docusate sodium (COLACE) 50 MG capsule, Take  by mouth  "Daily As Needed for Constipation., Disp: , Rfl:   •  IRON PO, Take  by mouth Daily., Disp: , Rfl:   •  lisinopril (PRINIVIL,ZESTRIL) 40 MG tablet, Take 1 tablet by mouth Daily., Disp: 90 tablet, Rfl: 3  •  melatonin 5 MG tablet tablet, Take 10 mg by mouth Every Night., Disp: , Rfl:   •  nitroglycerin (NITROSTAT) 0.4 MG SL tablet, Place 0.4 mg under the tongue Every 5 (Five) Minutes As Needed for Chest Pain. Take no more than 3 doses in 15 minutes., Disp: , Rfl:   •  vardenafil (LEVITRA) 20 MG tablet, Take 20 mg by mouth As Needed for erectile dysfunction., Disp: , Rfl:     History of Present Illness     Pt presents for follow up of AF/HTN/JOSELITO . Since we last saw the pt, pt denies any AF episodes, SOB, CP, LH, and dizziness. Denies any hospitalizations, ER visits, bleeding, or TIA/CVA symptoms. Overall feels well. BP has been labile. Was started on another med but got LH and stopped it..    ROS:  General:  Denies fatigue, + weight gain  Cardiovascular:  Denies CP, PND, syncope, near syncope, edema or palpitations.  Pulmonary:  + mild GRAYSON, No cough, or wheezing      Vitals:    10/31/18 1447   BP: 156/78   BP Location: Right arm   Patient Position: Sitting   Pulse: 66   SpO2: 96%   Weight: 108 kg (238 lb)   Height: 177.8 cm (70\")     Body mass index is 34.15 kg/m².  PE:  General: NAD  Neck: no JVD, no carotid bruits, no TM  Heart RRR, NL S1, S2, S4 present, no rubs, murmurs  Lungs: CTA, no wheezes, rhonchi, or rales  Abd: soft, non-tender, NL BS  Ext: No musculoskeletal deformities, no edema, cyanosis, or clubbing  Psych: normal mood and affect    Diagnostic Data:        ECG 12 Lead  Date/Time: 10/31/2018 3:04 PM  Performed by: JUNAID PITTMAN  Authorized by: JUNAID PITTMAN   Comparison: compared with previous ECG from 4/9/2018  Similar to previous ECG  Comparison to previous ECG: PVC new  Rhythm: sinus rhythm  Ectopy: PVCs  BPM: 70              1. Persistent atrial fibrillation (CMS/HCC)    2. Essential " hypertension    3. Coronary artery disease involving native coronary artery of native heart without angina pectoris          Plan:  1) Persistent AF s/p PVA: no clinical recurrence doing well off AA  Continue present medications.   2) Anticoagulation CHADVASC 3  Continue NOAC/plavix  3) HTN  Wt loss, exercise, salt reduction  4) CAD per Dr Guthrie    F/up in 12 months

## 2019-04-22 ENCOUNTER — RESULTS ENCOUNTER (OUTPATIENT)
Dept: CARDIOLOGY | Facility: CLINIC | Age: 69
End: 2019-04-22

## 2019-04-22 DIAGNOSIS — E78.5 DYSLIPIDEMIA: ICD-10-CM

## 2019-04-22 DIAGNOSIS — E78.5 DYSLIPIDEMIA: Primary | ICD-10-CM

## 2019-04-22 RX ORDER — ATORVASTATIN CALCIUM 80 MG/1
TABLET, FILM COATED ORAL
Qty: 30 TABLET | Refills: 1 | Status: SHIPPED | OUTPATIENT
Start: 2019-04-22 | End: 2019-04-25 | Stop reason: SDUPTHER

## 2019-04-23 NOTE — PROGRESS NOTES
Subjective:     Encounter Date:04/25/2019    Primary Care Physician: Trang Bellamy MD      Patient ID: Juan Lauren is a 68 y.o. male.    Chief Complaint:Atrial Fibrillation    PROBLEM LIST:     1. Coronary artery disease:   a. January 2004, ST elevation MI, cardiac cath revealed a 95% RCA stenosis, which was reduced to 0% after a 3.0 x 18 mm Cypher stent. There was no critical left coronary anatomy disease.   b. December 2012, GXT echocardiogram negative for ischemia.  c. On 10/07/2015, acute inferior STEMI. Catheterization is 90% thrombotic, VLST of RCA (neoatherosclerosis with plaque rupture). Restented 3.5 mm x 23 mm Xience Alpine. EF 45%.   2. Atrial fibrillation  a. Initial diagnosis in [facility]) 1/17. With RVR.  b. LEXII 1/17 possible OSCAR clot.  c. LEXII guided cardioversion 3/17 (4)  d. Recurrent asymptomatic A. fib 5/17, controlled rate.  e. Successful cardioversion to sinus rhythm on 5/26/2017.  f. Cardioversion successful until 6/26/2017.  g. 7/2017 PVA with Dr. May  3. Hypertension.  4. Dyslipidemia.   5. History of tobacco abuse.  6. Obstructive sleep apnea   a. Treated with CPAP   7. Family history of CAD with father having an MI in his 50s.  8. Surgeries:   a. Appendectomy.   b. Adenoidectomy.    No Known Allergies      Current Outpatient Medications:   •  apixaban (ELIQUIS) 5 MG tablet tablet, Take 1 tablet by mouth Every 12 (Twelve) Hours., Disp: 180 tablet, Rfl: 3  •  atorvastatin (LIPITOR) 80 MG tablet, TAKE 1 TABLET BY MOUTH DAILY **Needs updated labs for further refills**, Disp: 30 tablet, Rfl: 1  •  clopidogrel (PLAVIX) 75 MG tablet, Take 1 tablet by mouth Daily., Disp: 90 tablet, Rfl: 3  •  diltiaZEM CD (CARDIZEM CD) 180 MG 24 hr capsule, Take 1 capsule by mouth Daily., Disp: 90 capsule, Rfl: 4  •  docusate sodium (COLACE) 50 MG capsule, Take  by mouth Daily As Needed for Constipation., Disp: , Rfl:   •  IRON PO, Take  by mouth Daily., Disp: , Rfl:   •  lisinopril  (PRINIVIL,ZESTRIL) 40 MG tablet, Take 1 tablet by mouth Daily., Disp: 90 tablet, Rfl: 3  •  melatonin 5 MG tablet tablet, Take 10 mg by mouth Every Night., Disp: , Rfl:   •  nitroglycerin (NITROSTAT) 0.4 MG SL tablet, Place 0.4 mg under the tongue Every 5 (Five) Minutes As Needed for Chest Pain. Take no more than 3 doses in 15 minutes., Disp: , Rfl:   •  sildenafil (VIAGRA) 100 MG tablet, Take 100 mg by mouth As Needed for erectile dysfunction., Disp: , Rfl:         History of Present Illness    Patient returns today for follow-up of hypertension and coronary disease.  He was seen last month for hypertension out-of-control, and overall felt well.  At the time he was started on HCTZ, and with that he became quite dizzy within 3 days quit the medication and the symptoms are's are normal.  Of note there has been some confusion over his medicines and had not always been off his PRN clonidine and hydralazine, but is also not been taking the diltiazem.  Overall he has no cardiovascular complaints however.  He is upcoming invasive dental work planned.    The following portions of the patient's history were reviewed and updated as appropriate: allergies, current medications, past family history, past medical history, past social history, past surgical history and problem list.      Social History     Tobacco Use   • Smoking status: Former Smoker     Packs/day: 1.00     Years: 36.00     Pack years: 36.00     Types: Cigarettes     Last attempt to quit:      Years since quittin.3   • Smokeless tobacco: Never Used   Substance Use Topics   • Alcohol use: No   • Drug use: No         Review of Systems   Constitution: Negative.   Cardiovascular: Negative.    Respiratory: Negative.    Hematologic/Lymphatic: Negative for bleeding problem. Does not bruise/bleed easily.   Skin: Negative for rash.   Musculoskeletal: Negative for muscle weakness and myalgias.   Gastrointestinal: Negative for heartburn, nausea and vomiting.  "  Neurological: Negative.           Objective:    height is 177.8 cm (70\") and weight is 109 kg (240 lb). His blood pressure is 146/80 and his pulse is 71. His oxygen saturation is 95%.         Physical Exam   Constitutional: He is oriented to person, place, and time. He appears well-developed and well-nourished.   HENT:   Mouth/Throat: Oropharynx is clear and moist.   Neck: No JVD present. Carotid bruit is not present. No thyromegaly present.   Cardiovascular: Regular rhythm, S1 normal, S2 normal, normal heart sounds and intact distal pulses. Exam reveals no gallop, no S3 and no S4.   No murmur heard.  Pulses:       Carotid pulses are 2+ on the right side, and 2+ on the left side.       Radial pulses are 2+ on the right side, and 2+ on the left side.   Pulmonary/Chest: Breath sounds normal.   Abdominal: Soft. Bowel sounds are normal. He exhibits no mass. There is no tenderness.   Musculoskeletal: He exhibits no edema.   Neurological: He is alert and oriented to person, place, and time.   Skin: Skin is warm and dry. No rash noted.       Procedures          Assessment:   Assessment/Plan      Juan was seen today for atrial fibrillation.    Diagnoses and all orders for this visit:    Coronary artery disease involving native coronary artery of native heart without angina pectoris    Essential hypertension    Persistent atrial fibrillation (CMS/HCC)    Dyslipidemia      1.  Coronary artery disease, stable without angina  2.  Atrial for ablation status post PVI.  Stable  3.  Hypertension, mildly elevated still.  4.  Dyslipidemia controlled on statin    Recommendations we will continue current antihypertensive therapy and resume his diltiazem 180 mg daily.  Regarding his upcoming dental work, the patient may discontinue his Plavix 5 days, and his Eliquis 2 days ahead of procedure.  The duration of Eliquis therapy will be left at the discretion of Dr. May.       Mark Guthrie MD    Dictated utilizing [employer] " dictation

## 2019-04-25 ENCOUNTER — OFFICE VISIT (OUTPATIENT)
Dept: CARDIOLOGY | Facility: CLINIC | Age: 69
End: 2019-04-25

## 2019-04-25 VITALS
OXYGEN SATURATION: 95 % | HEART RATE: 71 BPM | WEIGHT: 240 LBS | SYSTOLIC BLOOD PRESSURE: 146 MMHG | DIASTOLIC BLOOD PRESSURE: 80 MMHG | HEIGHT: 70 IN | BODY MASS INDEX: 34.36 KG/M2

## 2019-04-25 DIAGNOSIS — I25.10 CORONARY ARTERY DISEASE INVOLVING NATIVE CORONARY ARTERY OF NATIVE HEART WITHOUT ANGINA PECTORIS: Primary | ICD-10-CM

## 2019-04-25 DIAGNOSIS — I10 ESSENTIAL HYPERTENSION: ICD-10-CM

## 2019-04-25 DIAGNOSIS — I48.19 PERSISTENT ATRIAL FIBRILLATION (HCC): ICD-10-CM

## 2019-04-25 DIAGNOSIS — E78.5 DYSLIPIDEMIA: ICD-10-CM

## 2019-04-25 PROCEDURE — 99213 OFFICE O/P EST LOW 20 MIN: CPT | Performed by: INTERNAL MEDICINE

## 2019-04-25 RX ORDER — ATORVASTATIN CALCIUM 80 MG/1
TABLET, FILM COATED ORAL
Qty: 30 TABLET | Refills: 1 | Status: SHIPPED | OUTPATIENT
Start: 2019-04-25 | End: 2019-05-17 | Stop reason: SDUPTHER

## 2019-04-25 RX ORDER — SILDENAFIL 100 MG/1
100 TABLET, FILM COATED ORAL AS NEEDED
COMMUNITY

## 2019-04-25 RX ORDER — CLOPIDOGREL BISULFATE 75 MG/1
75 TABLET ORAL DAILY
Qty: 90 TABLET | Refills: 3 | Status: SHIPPED | OUTPATIENT
Start: 2019-04-25 | End: 2020-04-10 | Stop reason: SDUPTHER

## 2019-04-25 RX ORDER — NITROGLYCERIN 0.4 MG/1
0.4 TABLET SUBLINGUAL
Qty: 50 TABLET | Refills: 5 | Status: SHIPPED | OUTPATIENT
Start: 2019-04-25 | End: 2020-04-10 | Stop reason: SDUPTHER

## 2019-04-25 RX ORDER — LISINOPRIL 40 MG/1
40 TABLET ORAL DAILY
Qty: 90 TABLET | Refills: 3 | Status: SHIPPED | OUTPATIENT
Start: 2019-04-25

## 2019-04-25 RX ORDER — DILTIAZEM HYDROCHLORIDE 180 MG/1
180 CAPSULE, COATED, EXTENDED RELEASE ORAL DAILY
Qty: 90 CAPSULE | Refills: 4 | Status: SHIPPED | OUTPATIENT
Start: 2019-04-25 | End: 2020-04-10 | Stop reason: SDUPTHER

## 2019-05-08 ENCOUNTER — TELEPHONE (OUTPATIENT)
Dept: CARDIOLOGY | Facility: CLINIC | Age: 69
End: 2019-05-08

## 2019-05-08 NOTE — TELEPHONE ENCOUNTER
Left VM requesting call back, in response to patient call about blood pressure medication question.

## 2019-05-09 NOTE — TELEPHONE ENCOUNTER
Spoke with patient, advised Dr. Guthrie does not recommend any changes unless BP has been increased and can consider increasing diltiazem dose, he reports SP running in 120's so will keep meds as are.  If any increases he will contact us.

## 2019-05-17 RX ORDER — ATORVASTATIN CALCIUM 80 MG/1
TABLET, FILM COATED ORAL
Qty: 90 TABLET | Refills: 0 | Status: SHIPPED | OUTPATIENT
Start: 2019-05-17 | End: 2019-08-27 | Stop reason: SDUPTHER

## 2019-08-27 RX ORDER — ATORVASTATIN CALCIUM 80 MG/1
TABLET, FILM COATED ORAL
Qty: 90 TABLET | Refills: 2 | Status: SHIPPED | OUTPATIENT
Start: 2019-08-27 | End: 2020-04-10 | Stop reason: SDUPTHER

## 2020-01-01 ENCOUNTER — OFFICE VISIT (OUTPATIENT)
Dept: CARDIOLOGY | Facility: CLINIC | Age: 70
End: 2020-01-01

## 2020-01-01 VITALS
HEART RATE: 73 BPM | OXYGEN SATURATION: 99 % | WEIGHT: 247 LBS | SYSTOLIC BLOOD PRESSURE: 144 MMHG | HEIGHT: 70 IN | TEMPERATURE: 98.6 F | DIASTOLIC BLOOD PRESSURE: 80 MMHG | BODY MASS INDEX: 35.36 KG/M2

## 2020-01-01 DIAGNOSIS — I48.19 PERSISTENT ATRIAL FIBRILLATION (HCC): ICD-10-CM

## 2020-01-01 DIAGNOSIS — I10 ESSENTIAL HYPERTENSION: ICD-10-CM

## 2020-01-01 DIAGNOSIS — I25.10 CORONARY ARTERY DISEASE INVOLVING NATIVE CORONARY ARTERY OF NATIVE HEART WITHOUT ANGINA PECTORIS: Primary | ICD-10-CM

## 2020-01-01 DIAGNOSIS — E78.5 DYSLIPIDEMIA: ICD-10-CM

## 2020-01-01 PROCEDURE — 99214 OFFICE O/P EST MOD 30 MIN: CPT | Performed by: INTERNAL MEDICINE

## 2020-01-01 RX ORDER — CLOPIDOGREL BISULFATE 75 MG/1
75 TABLET ORAL DAILY
Qty: 90 TABLET | Refills: 3 | Status: SHIPPED | OUTPATIENT
Start: 2020-01-01

## 2020-01-01 RX ORDER — DILTIAZEM HYDROCHLORIDE 180 MG/1
180 CAPSULE, COATED, EXTENDED RELEASE ORAL DAILY
Qty: 90 CAPSULE | Refills: 4 | Status: SHIPPED | OUTPATIENT
Start: 2020-01-01

## 2020-01-01 RX ORDER — NITROGLYCERIN 0.4 MG/1
0.4 TABLET SUBLINGUAL
Qty: 50 TABLET | Refills: 5 | Status: SHIPPED | OUTPATIENT
Start: 2020-01-01

## 2020-01-01 RX ORDER — ATORVASTATIN CALCIUM 80 MG/1
TABLET, FILM COATED ORAL
Qty: 90 TABLET | Refills: 3 | Status: SHIPPED | OUTPATIENT
Start: 2020-01-01

## 2020-04-10 RX ORDER — ATORVASTATIN CALCIUM 80 MG/1
TABLET, FILM COATED ORAL
Qty: 90 TABLET | Refills: 2 | Status: SHIPPED | OUTPATIENT
Start: 2020-04-10 | End: 2020-06-02

## 2020-04-10 RX ORDER — DILTIAZEM HYDROCHLORIDE 180 MG/1
180 CAPSULE, COATED, EXTENDED RELEASE ORAL DAILY
Qty: 90 CAPSULE | Refills: 4 | Status: SHIPPED | OUTPATIENT
Start: 2020-04-10 | End: 2020-01-01 | Stop reason: SDUPTHER

## 2020-04-10 RX ORDER — CLOPIDOGREL BISULFATE 75 MG/1
75 TABLET ORAL DAILY
Qty: 90 TABLET | Refills: 3 | Status: SHIPPED | OUTPATIENT
Start: 2020-04-10 | End: 2020-01-01 | Stop reason: SDUPTHER

## 2020-04-10 RX ORDER — NITROGLYCERIN 0.4 MG/1
0.4 TABLET SUBLINGUAL
Qty: 50 TABLET | Refills: 5 | Status: SHIPPED | OUTPATIENT
Start: 2020-04-10 | End: 2020-01-01 | Stop reason: SDUPTHER

## 2020-04-13 RX ORDER — APIXABAN 5 MG/1
TABLET, FILM COATED ORAL
Qty: 180 TABLET | Refills: 3 | Status: SHIPPED | OUTPATIENT
Start: 2020-04-13 | End: 2020-01-01 | Stop reason: SDUPTHER

## 2020-06-02 RX ORDER — ATORVASTATIN CALCIUM 80 MG/1
TABLET, FILM COATED ORAL
Qty: 30 TABLET | Refills: 0 | Status: SHIPPED | OUTPATIENT
Start: 2020-06-02 | End: 2020-06-16 | Stop reason: SDUPTHER

## 2020-06-16 RX ORDER — ATORVASTATIN CALCIUM 80 MG/1
TABLET, FILM COATED ORAL
Qty: 90 TABLET | Refills: 3 | Status: SHIPPED | OUTPATIENT
Start: 2020-06-16 | End: 2020-01-01 | Stop reason: SDUPTHER

## 2020-06-30 ENCOUNTER — TELEPHONE (OUTPATIENT)
Dept: CARDIOLOGY | Facility: CLINIC | Age: 70
End: 2020-06-30

## 2020-06-30 NOTE — TELEPHONE ENCOUNTER
Dr. Ravindra Callejas's office requesting pre colonoscopy risk assmt as well as instructions regarding Plavix and eliquis holding.

## 2020-10-05 NOTE — PROGRESS NOTES
[facility] Cardiology  Subjective:     Encounter Date: 10/05/2020      Patient ID: Juan Lauren is a 70 y.o. male.    Chief Complaint: Coronary Artery Disease      PROBLEM LIST:  1. Coronary artery disease:   a. January 2004, ST elevation MI, cardiac cath revealed a 95% RCA stenosis, which was reduced to 0% after a 3.0 x 18 mm Cypher stent. There was no critical left coronary anatomy disease.   b. December 2012, GXT echocardiogram negative for ischemia.  c. On 10/07/2015, acute inferior STEMI. Catheterization is 90% thrombotic, VLST of RCA (neoatherosclerosis with plaque rupture). Restented 3.5 mm x 23 mm Xience Alpine. EF 45%.   2. Atrial fibrillation  a. Initial diagnosis in [facility]) 1/17. With RVR.  b. LEXII 1/17 possible OSCAR clot.  c. LEXII guided cardioversion 3/17 (4)  d. Recurrent asymptomatic A. fib 5/17, controlled rate.  e. Successful cardioversion to sinus rhythm on 5/26/2017.  f. Cardioversion successful until 6/26/2017.  g. 7/2017 PVA with Dr. May  3. Hypertension.  4. Dyslipidemia.   5. History of tobacco abuse.  6. Obstructive sleep apnea   a. Treated with CPAP   7. Family history of CAD with father having an MI in his 50s.  8. Surgeries:   a. Appendectomy.   b. Adenoidectomy      History of Present Illness  Juan Lauren returns today for an annual follow up with a history of coronary artery disease and cardiac risk factors. Since last visit, patient has been feeling well overall from a cardiovascular standpoint. He reports that Dr. Bellamy has recently altered his dose of lisinopril to 40 mg BID to control his elevated blood pressure. Patient has not had any ER visits, hospitalizations, surgeries, or new diagnoses since he was last seen. Patient denies chest pain, shortness of breath, palpitations, edema, dizziness, and syncope.      No Known Allergies      Current Outpatient Medications:   •  apixaban (Eliquis) 5 MG tablet tablet, Take 1 tablet by  mouth Every 12 (Twelve) Hours., Disp: 180 tablet, Rfl: 3  •  atorvastatin (LIPITOR) 80 MG tablet, TAKE 1 TABLET BY MOUTH ONCE DAILY, Disp: 90 tablet, Rfl: 3  •  clopidogrel (PLAVIX) 75 MG tablet, Take 1 tablet by mouth Daily., Disp: 90 tablet, Rfl: 3  •  dilTIAZem CD (CARDIZEM CD) 180 MG 24 hr capsule, Take 1 capsule by mouth Daily., Disp: 90 capsule, Rfl: 4  •  docusate sodium (COLACE) 50 MG capsule, Take  by mouth Daily As Needed for Constipation., Disp: , Rfl:   •  lisinopril (PRINIVIL,ZESTRIL) 40 MG tablet, Take 1 tablet by mouth Daily. (Patient taking differently: Take 40 mg by mouth 2 (two) times a day.), Disp: 90 tablet, Rfl: 3  •  melatonin 5 MG tablet tablet, Take 10 mg by mouth Every Night., Disp: , Rfl:   •  nitroglycerin (NITROSTAT) 0.4 MG SL tablet, Place 1 tablet under the tongue Every 5 (Five) Minutes As Needed for Chest Pain. Take no more than 3 doses in 15 minutes., Disp: 50 tablet, Rfl: 5  •  sildenafil (VIAGRA) 100 MG tablet, Take 100 mg by mouth As Needed for erectile dysfunction., Disp: , Rfl:   •  IRON PO, Take  by mouth Daily., Disp: , Rfl:     The following portions of the patient's history were reviewed and updated as appropriate: allergies, current medications, past family history, past medical history, past social history, past surgical history and problem list.    Review of Systems   Constitution: Negative.   Cardiovascular: Negative for chest pain, dyspnea on exertion, leg swelling, palpitations and syncope.   Respiratory: Negative.  Negative for shortness of breath.    Hematologic/Lymphatic: Negative for bleeding problem. Does not bruise/bleed easily.   Skin: Negative for rash.   Musculoskeletal: Negative for muscle weakness and myalgias.   Gastrointestinal: Negative for heartburn, nausea and vomiting.   Neurological: Negative for dizziness, light-headedness, loss of balance and numbness.          Objective:     Vitals:    10/05/20 1103   BP: 144/80   BP Location: Left arm   Patient  "Position: Sitting   Pulse: 73   Temp: 98.6 °F (37 °C)   SpO2: 99%   Weight: 112 kg (247 lb)   Height: 177.8 cm (70\")         Vitals signs reviewed.   Constitutional:       Appearance: Well-developed and not in distress.   Neck:      Thyroid: No thyromegaly.      Vascular: No carotid bruit or JVD.   Pulmonary:      Breath sounds: Normal breath sounds.   Cardiovascular:      Regular rhythm.      No gallop. No S3 and S4 gallop.   Edema:     Peripheral edema absent.   Abdominal:      General: Bowel sounds are normal.      Palpations: Abdomen is soft. There is no abdominal mass.      Tenderness: There is no abdominal tenderness.   Musculoskeletal:         General: No deformity.      Extremities: No clubbing present.  Skin:     General: Skin is warm and dry.      Findings: No rash.   Neurological:      Mental Status: Alert and oriented to person, place, and time.         Lab Review:  Lab date: 06/05/20  • FLP: TC 97, , HDL 29, LDL 42  • CMP: Glu 134, BUN 14, Creat 0.84, , Na 140, K 4.3, Cl 106, CO2 25, Ca 9.2, Alk Phos 78, AST 21, ALT 24  • CBC: WBC 4.9, RBC 4.28, HGB 12.9, HCT 38.1, MCV 89, MCH 30,   • HbA1c: 7.0%    Procedures        Assessment:   Juan was seen today for coronary artery disease.    Diagnoses and all orders for this visit:    Coronary artery disease involving native coronary artery of native heart without angina pectoris  -     Stress Test With Myocardial Perfusion; Future    Essential hypertension    Persistent atrial fibrillation (CMS/HCC)  -     Stress Test With Myocardial Perfusion; Future    Dyslipidemia    Other orders  -     atorvastatin (LIPITOR) 80 MG tablet; TAKE 1 TABLET BY MOUTH ONCE DAILY  -     clopidogrel (PLAVIX) 75 MG tablet; Take 1 tablet by mouth Daily.  -     dilTIAZem CD (CARDIZEM CD) 180 MG 24 hr capsule; Take 1 capsule by mouth Daily.  -     apixaban (Eliquis) 5 MG tablet tablet; Take 1 tablet by mouth Every 12 (Twelve) Hours.  -     nitroglycerin (NITROSTAT) 0.4 MG " SL tablet; Place 1 tablet under the tongue Every 5 (Five) Minutes As Needed for Chest Pain. Take no more than 3 doses in 15 minutes.        Impression:  1. Coronary artery disease, stable without angina; on Plavix for antiplatelet therapy.  No current angina, no ischemic evaluation in more than 5 years  2. Atrial fibrillation s/p PVI, stable; On Eliquis 5 mg.  (Was asymptomatic with A. fib despite RVR in the past)  3. Hypertension, slightly elevated in office today; recently increased lisinopril 40 mg BID with improvement..  4. Dyslipidemia, controlled on statin therapy.    Plan:  1. Stable cardiac status overall.  2. MPS prior to next visit  3. Continue current medications.  4. Revisit in 12 MO, or sooner as needed.    Scribed for Makr Guthrie MD by Tyrone Hutton 10/5/2020  12:14 EDT    Mark Guthrie MD      Please note that portions of this note may have been completed with a voice recognition program. Efforts were made to edit the dictations, but occasionally words are mistranscribed.

## 2021-01-01 ENCOUNTER — TELEPHONE (OUTPATIENT)
Dept: CARDIOLOGY | Facility: CLINIC | Age: 71
End: 2021-01-01

## 2021-01-01 ENCOUNTER — HOSPITAL ENCOUNTER (OUTPATIENT)
Dept: CARDIOLOGY | Facility: HOSPITAL | Age: 71
Discharge: HOME | End: 2021-05-17
Admitting: INTERNAL MEDICINE

## 2021-01-01 ENCOUNTER — APPOINTMENT (OUTPATIENT)
Dept: PREADMISSION TESTING | Facility: HOSPITAL | Age: 71
End: 2021-01-01

## 2021-01-01 VITALS
DIASTOLIC BLOOD PRESSURE: 95 MMHG | WEIGHT: 247 LBS | BODY MASS INDEX: 35.36 KG/M2 | HEART RATE: 65 BPM | HEIGHT: 70 IN | SYSTOLIC BLOOD PRESSURE: 180 MMHG

## 2021-01-01 DIAGNOSIS — I48.19 PERSISTENT ATRIAL FIBRILLATION (HCC): ICD-10-CM

## 2021-01-01 DIAGNOSIS — I25.10 CORONARY ARTERY DISEASE INVOLVING NATIVE CORONARY ARTERY OF NATIVE HEART WITHOUT ANGINA PECTORIS: ICD-10-CM

## 2021-01-01 LAB
BH CV REST NUCLEAR ISOTOPE DOSE: 9.5 MCI
BH CV STRESS BP STAGE 1: NORMAL
BH CV STRESS BP STAGE 2: NORMAL
BH CV STRESS DURATION MIN STAGE 1: 3
BH CV STRESS DURATION MIN STAGE 2: 1
BH CV STRESS DURATION SEC STAGE 1: 0
BH CV STRESS DURATION SEC STAGE 2: 11
BH CV STRESS GRADE STAGE 1: 10
BH CV STRESS GRADE STAGE 2: 12
BH CV STRESS HR STAGE 1: 150
BH CV STRESS HR STAGE 2: 141
BH CV STRESS METS STAGE 1: 5
BH CV STRESS METS STAGE 2: 7.5
BH CV STRESS NUCLEAR ISOTOPE DOSE: 32.3 MCI
BH CV STRESS O2 STAGE 1: 95
BH CV STRESS O2 STAGE 2: 94
BH CV STRESS PROTOCOL 1: NORMAL
BH CV STRESS RECOVERY BP: NORMAL MMHG
BH CV STRESS RECOVERY HR: 93 BPM
BH CV STRESS RECOVERY O2: 97 %
BH CV STRESS SPEED STAGE 1: 1.7
BH CV STRESS SPEED STAGE 2: 2.5
BH CV STRESS STAGE 1: 1
BH CV STRESS STAGE 2: 2
LV EF NUC BP: 57 %
MAXIMAL PREDICTED HEART RATE: 152 BPM
PERCENT MAX PREDICTED HR: 106.58 %
STRESS BASELINE BP: NORMAL MMHG
STRESS BASELINE HR: 65 BPM
STRESS O2 SAT REST: 96 %
STRESS PERCENT HR: 125 %
STRESS POST ESTIMATED WORKLOAD: 7 METS
STRESS POST EXERCISE DUR MIN: 4 MIN
STRESS POST EXERCISE DUR SEC: 11 SEC
STRESS POST O2 SAT PEAK: 94 %
STRESS POST PEAK BP: NORMAL MMHG
STRESS POST PEAK HR: 162 BPM
STRESS TARGET HR: 129 BPM

## 2021-01-01 PROCEDURE — 0 TECHNETIUM SESTAMIBI: Performed by: INTERNAL MEDICINE

## 2021-01-01 PROCEDURE — 93018 CV STRESS TEST I&R ONLY: CPT | Performed by: INTERNAL MEDICINE

## 2021-01-01 PROCEDURE — 93017 CV STRESS TEST TRACING ONLY: CPT

## 2021-01-01 PROCEDURE — 78452 HT MUSCLE IMAGE SPECT MULT: CPT

## 2021-01-01 PROCEDURE — A9500 TC99M SESTAMIBI: HCPCS | Performed by: INTERNAL MEDICINE

## 2021-01-01 PROCEDURE — 78452 HT MUSCLE IMAGE SPECT MULT: CPT | Performed by: INTERNAL MEDICINE

## 2021-01-01 RX ADMIN — TECHNETIUM TC 99M SESTAMIBI 1 DOSE: 1 INJECTION INTRAVENOUS at 13:58

## 2021-01-01 RX ADMIN — TECHNETIUM TC 99M SESTAMIBI 1 DOSE: 1 INJECTION INTRAVENOUS at 12:05

## 2021-05-19 NOTE — TELEPHONE ENCOUNTER
Spoke with patient, advised of below.  Understanding verbalized.       ----- Message from Mark Guthrie MD sent at 5/18/2021  4:49 PM EDT -----  Old MI.  No ischemia.

## (undated) DEVICE — Device: Brand: SMARTABLATE

## (undated) DEVICE — SOL NACL 0.9PCT 1000ML

## (undated) DEVICE — ST EXT IV SMARTSITE 2VLV SP M LL 5ML IV1

## (undated) DEVICE — ST EXT IV LG BORE NDLESS FLTR LL 17IN

## (undated) DEVICE — CATH SHEATH GUIDE SWARTZ 6FR

## (undated) DEVICE — Device: Brand: MEDEX

## (undated) DEVICE — Device: Brand: THERMOCOOL SMARTTOUCH SF

## (undated) DEVICE — KT MANIFLD EP

## (undated) DEVICE — DOME MONITORING W BONDED STPCK BIOTRANS2

## (undated) DEVICE — INTRO SWARTZ TRNSEP LAMP90 8.5F 63CM

## (undated) DEVICE — GW DIAG .032

## (undated) DEVICE — ST INF PRI SMRTSTE 20DRP 2VLV 24ML 117

## (undated) DEVICE — Device: Brand: REFERENCE PATCH CARTO 3

## (undated) DEVICE — Device: Brand: WEBSTER

## (undated) DEVICE — Device: Brand: SOUNDSTAR

## (undated) DEVICE — STERILE (15.2 TAPERED TO 7.6 X 183CM) POLYETHYLENE ACCORDION-FOLDED COVER FOR USE WITH SIEMENS ACUNAV ULTRASOUND CATHETER FAMILY CONNECTOR: Brand: SWIFTLINK TRANSDUCER COVER

## (undated) DEVICE — DECANT BG O JET

## (undated) DEVICE — SET PRIMARY GRVTY 10DP MALE LL 104IN

## (undated) DEVICE — INTRO SHEATH FAST/CATH LG/LUM 11F .038IN 12CM

## (undated) DEVICE — ADULT, W/LG. BACK PAD, RADIOTRANSPARENT ELEMENT AND LEAD WIRE: Brand: DEFIBRILLATION ELECTRODES

## (undated) DEVICE — LEX ELECTRO PHYSIOLOGY: Brand: MEDLINE INDUSTRIES, INC.

## (undated) DEVICE — CONTRL CONTRST CHMBRD W/TBG72IN REUS

## (undated) DEVICE — INTRO SHEATH ENGAGE W/50 GW .038 7F12

## (undated) DEVICE — PRESSURE MONITORING SET: Brand: TRUWAVE

## (undated) DEVICE — INTRO SHEATH 8F60CM

## (undated) DEVICE — Device: Brand: LASSO NAV

## (undated) DEVICE — DECANTER: Brand: UNBRANDED

## (undated) DEVICE — CATH DIAG EXPO .045 AL1 5F 100CM

## (undated) DEVICE — INTRO SHEATH ART/FEM ENGAGE .035 8F12CM

## (undated) DEVICE — DRSNG SURESITE123 4X4.8IN

## (undated) DEVICE — SYS TRNSEP ACC BRK ACROSS A/ 71CM